# Patient Record
Sex: MALE | Race: OTHER | Employment: FULL TIME | URBAN - NONMETROPOLITAN AREA
[De-identification: names, ages, dates, MRNs, and addresses within clinical notes are randomized per-mention and may not be internally consistent; named-entity substitution may affect disease eponyms.]

---
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
## 2019-02-15 ENCOUNTER — NURSE TRIAGE (OUTPATIENT)
Dept: ADMINISTRATIVE | Age: 19
End: 2019-02-15

## 2019-02-15 ENCOUNTER — HOSPITAL ENCOUNTER (EMERGENCY)
Age: 19
Discharge: HOME | End: 2019-02-15
Attending: EMERGENCY MEDICINE

## 2019-02-15 VITALS
RESPIRATION RATE: 16 BRPM | BODY MASS INDEX: 32.95 KG/M2 | OXYGEN SATURATION: 99 % | WEIGHT: 205 LBS | DIASTOLIC BLOOD PRESSURE: 92 MMHG | TEMPERATURE: 98 F | HEIGHT: 66 IN | SYSTOLIC BLOOD PRESSURE: 132 MMHG | HEART RATE: 110 BPM

## 2019-02-15 DIAGNOSIS — M77.8 TENDINITIS OF RIGHT FOREARM: ICD-10-CM

## 2019-02-15 DIAGNOSIS — M79.631 PAIN IN RIGHT FOREARM: Primary | ICD-10-CM

## 2019-02-15 PROCEDURE — 99283 EMERGENCY DEPT VISIT LOW MDM: CPT

## 2019-02-15 PROCEDURE — 6370000000 HC RX 637 (ALT 250 FOR IP): Performed by: EMERGENCY MEDICINE

## 2019-02-15 RX ORDER — CEPHALEXIN 500 MG/1
500 CAPSULE ORAL 3 TIMES DAILY
Qty: 21 CAPSULE | Refills: 0 | Status: SHIPPED | OUTPATIENT
Start: 2019-02-15 | End: 2019-02-22

## 2019-02-15 RX ORDER — CEPHALEXIN 500 MG/1
500 CAPSULE ORAL ONCE
Status: COMPLETED | OUTPATIENT
Start: 2019-02-15 | End: 2019-02-15

## 2019-02-15 RX ADMIN — CEPHALEXIN 500 MG: 500 CAPSULE ORAL at 22:06

## 2019-02-15 ASSESSMENT — PAIN DESCRIPTION - LOCATION: LOCATION: ARM

## 2019-02-15 ASSESSMENT — PAIN DESCRIPTION - ORIENTATION: ORIENTATION: RIGHT

## 2019-02-15 ASSESSMENT — PAIN SCALES - GENERAL: PAINLEVEL_OUTOF10: 6

## 2019-03-14 ENCOUNTER — HOSPITAL ENCOUNTER (EMERGENCY)
Age: 19
Discharge: HOME | End: 2019-03-14
Attending: FAMILY MEDICINE

## 2019-03-14 VITALS
SYSTOLIC BLOOD PRESSURE: 127 MMHG | RESPIRATION RATE: 20 BRPM | OXYGEN SATURATION: 97 % | DIASTOLIC BLOOD PRESSURE: 77 MMHG | HEART RATE: 100 BPM | TEMPERATURE: 98.8 F

## 2019-03-14 DIAGNOSIS — R11.2 NON-INTRACTABLE VOMITING WITH NAUSEA, UNSPECIFIED VOMITING TYPE: ICD-10-CM

## 2019-03-14 DIAGNOSIS — G44.039 NONINTRACTABLE PAROXYSMAL HEMICRANIA, UNSPECIFIED CHRONICITY PATTERN: Primary | ICD-10-CM

## 2019-03-14 LAB
ALBUMIN SERPL-MCNC: 3.7 GM/DL (ref 3.4–5)
ALP BLD-CCNC: 90 U/L (ref 46–116)
ALT SERPL-CCNC: 25 U/L (ref 14–63)
ANION GAP: 8 MEQ/L (ref 8–16)
AST SERPL-CCNC: 15 U/L (ref 15–37)
BASOPHILS # BLD: 0.7 % (ref 0–3)
BILIRUB SERPL-MCNC: 0.4 MG/DL (ref 0.2–1)
BUN BLDV-MCNC: 9 MG/DL (ref 7–18)
CHLORIDE BLD-SCNC: 102 MEQ/L (ref 98–107)
CO2: 28 MEQ/L (ref 21–32)
CREAT SERPL-MCNC: 1.1 MG/DL (ref 0.6–1.3)
EOSINOPHILS RELATIVE PERCENT: 0.7 % (ref 0–4)
FLU A ANTIGEN: NEGATIVE
FLU B ANTIGEN: NEGATIVE
GFR, ESTIMATED: > 90 ML/MIN/1.73M2
GLUCOSE BLD-MCNC: 106 MG/DL (ref 74–106)
GROUP A STREP CULTURE, REFLEX: NEGATIVE
HCT VFR BLD CALC: 42.2 % (ref 42–52)
HEMOGLOBIN: 14.3 GM/DL (ref 14–18)
LYMPHOCYTES # BLD: 15.6 % (ref 15–47)
MCH RBC QN AUTO: 29.7 PG (ref 27–31)
MCHC RBC AUTO-ENTMCNC: 34 GM/DL (ref 33–37)
MCV RBC AUTO: 87.4 FL (ref 80–94)
MONOCYTES: 14.1 % (ref 0–12)
PDW BLD-RTO: 13.2 % (ref 11.5–14.5)
PLATELET # BLD: 263 THOU/MM3 (ref 130–400)
PMV BLD AUTO: 6.8 FL (ref 7.4–10.4)
POC CALCIUM: 8.7 MG/DL (ref 8.5–10.1)
POTASSIUM SERPL-SCNC: 3.5 MEQ/L (ref 3.5–5.1)
RBC # BLD: 4.83 MILL/MM3 (ref 4.7–6.1)
REFLEX THROAT C + S: NORMAL
SEGS: 68.9 % (ref 43–75)
SODIUM BLD-SCNC: 138 MEQ/L (ref 136–145)
TOTAL PROTEIN: 7.4 GM/DL (ref 6.4–8.2)
WBC # BLD: 8.8 THOU/MM3 (ref 4.8–10.8)

## 2019-03-14 PROCEDURE — 85025 COMPLETE CBC W/AUTO DIFF WBC: CPT

## 2019-03-14 PROCEDURE — 2709999900 HC NON-CHARGEABLE SUPPLY

## 2019-03-14 PROCEDURE — 87804 INFLUENZA ASSAY W/OPTIC: CPT

## 2019-03-14 PROCEDURE — 80053 COMPREHEN METABOLIC PANEL: CPT

## 2019-03-14 PROCEDURE — 36415 COLL VENOUS BLD VENIPUNCTURE: CPT

## 2019-03-14 PROCEDURE — 2580000003 HC RX 258: Performed by: FAMILY MEDICINE

## 2019-03-14 PROCEDURE — 6360000002 HC RX W HCPCS: Performed by: FAMILY MEDICINE

## 2019-03-14 PROCEDURE — 96374 THER/PROPH/DIAG INJ IV PUSH: CPT

## 2019-03-14 PROCEDURE — 87880 STREP A ASSAY W/OPTIC: CPT

## 2019-03-14 PROCEDURE — 87070 CULTURE OTHR SPECIMN AEROBIC: CPT

## 2019-03-14 PROCEDURE — 99283 EMERGENCY DEPT VISIT LOW MDM: CPT

## 2019-03-14 RX ORDER — ONDANSETRON 4 MG/1
4 TABLET, ORALLY DISINTEGRATING ORAL EVERY 8 HOURS PRN
Qty: 20 TABLET | Refills: 0 | Status: SHIPPED | OUTPATIENT
Start: 2019-03-14 | End: 2019-03-19 | Stop reason: ALTCHOICE

## 2019-03-14 RX ORDER — 0.9 % SODIUM CHLORIDE 0.9 %
1000 INTRAVENOUS SOLUTION INTRAVENOUS ONCE
Status: COMPLETED | OUTPATIENT
Start: 2019-03-14 | End: 2019-03-14

## 2019-03-14 RX ORDER — KETOROLAC TROMETHAMINE 30 MG/ML
30 INJECTION, SOLUTION INTRAMUSCULAR; INTRAVENOUS ONCE
Status: COMPLETED | OUTPATIENT
Start: 2019-03-14 | End: 2019-03-14

## 2019-03-14 RX ADMIN — KETOROLAC TROMETHAMINE 30 MG: 30 INJECTION, SOLUTION INTRAMUSCULAR at 12:18

## 2019-03-14 RX ADMIN — SODIUM CHLORIDE 1000 ML: 9 INJECTION, SOLUTION INTRAVENOUS at 12:18

## 2019-03-14 ASSESSMENT — PAIN DESCRIPTION - LOCATION
LOCATION: HEAD
LOCATION: HEAD

## 2019-03-14 ASSESSMENT — ENCOUNTER SYMPTOMS
SINUS PRESSURE: 0
VOMITING: 1
EYE PAIN: 0
ABDOMINAL DISTENTION: 0
ABDOMINAL PAIN: 0
SORE THROAT: 1
CONSTIPATION: 0
EYE DISCHARGE: 0
COUGH: 1
WHEEZING: 0
CHEST TIGHTNESS: 0
NAUSEA: 1
PHOTOPHOBIA: 0
STRIDOR: 0
EYE REDNESS: 0
SHORTNESS OF BREATH: 0
BACK PAIN: 0
RHINORRHEA: 0
DIARRHEA: 0

## 2019-03-14 ASSESSMENT — PAIN SCALES - GENERAL
PAINLEVEL_OUTOF10: 4
PAINLEVEL_OUTOF10: 9
PAINLEVEL_OUTOF10: 8

## 2019-03-16 LAB — THROAT/NOSE CULTURE: NORMAL

## 2019-03-19 ENCOUNTER — HOSPITAL ENCOUNTER (EMERGENCY)
Age: 19
Discharge: HOME | End: 2019-03-19
Attending: EMERGENCY MEDICINE

## 2019-03-19 VITALS
DIASTOLIC BLOOD PRESSURE: 82 MMHG | TEMPERATURE: 98.8 F | HEART RATE: 66 BPM | RESPIRATION RATE: 16 BRPM | SYSTOLIC BLOOD PRESSURE: 122 MMHG | BODY MASS INDEX: 31.99 KG/M2 | WEIGHT: 192 LBS | OXYGEN SATURATION: 100 % | HEIGHT: 65 IN

## 2019-03-19 DIAGNOSIS — K05.10 GINGIVITIS: Primary | ICD-10-CM

## 2019-03-19 PROCEDURE — 99282 EMERGENCY DEPT VISIT SF MDM: CPT

## 2019-03-19 PROCEDURE — 6370000000 HC RX 637 (ALT 250 FOR IP): Performed by: EMERGENCY MEDICINE

## 2019-03-19 RX ORDER — CLINDAMYCIN HYDROCHLORIDE 150 MG/1
300 CAPSULE ORAL 3 TIMES DAILY
Qty: 42 CAPSULE | Refills: 0 | Status: SHIPPED | OUTPATIENT
Start: 2019-03-19 | End: 2019-03-26

## 2019-03-19 RX ORDER — CLINDAMYCIN HYDROCHLORIDE 150 MG/1
300 CAPSULE ORAL ONCE
Status: COMPLETED | OUTPATIENT
Start: 2019-03-19 | End: 2019-03-19

## 2019-03-19 RX ORDER — CHLORHEXIDINE GLUCONATE 0.12 MG/ML
15 RINSE ORAL 2 TIMES DAILY
Qty: 420 ML | Refills: 0 | Status: SHIPPED | OUTPATIENT
Start: 2019-03-19 | End: 2019-04-02

## 2019-03-19 RX ADMIN — CLINDAMYCIN HYDROCHLORIDE 300 MG: 150 CAPSULE ORAL at 17:34

## 2019-03-19 ASSESSMENT — PAIN DESCRIPTION - PAIN TYPE: TYPE: ACUTE PAIN

## 2019-03-19 ASSESSMENT — PAIN DESCRIPTION - LOCATION: LOCATION: MOUTH

## 2019-03-19 ASSESSMENT — PAIN SCALES - GENERAL: PAINLEVEL_OUTOF10: 10

## 2019-06-06 ENCOUNTER — HOSPITAL ENCOUNTER (EMERGENCY)
Age: 19
Discharge: HOME | End: 2019-06-06
Attending: FAMILY MEDICINE

## 2019-06-06 VITALS
DIASTOLIC BLOOD PRESSURE: 63 MMHG | TEMPERATURE: 96.7 F | SYSTOLIC BLOOD PRESSURE: 118 MMHG | OXYGEN SATURATION: 98 % | RESPIRATION RATE: 16 BRPM | HEART RATE: 89 BPM

## 2019-06-06 DIAGNOSIS — G44.209 ACUTE NON INTRACTABLE TENSION-TYPE HEADACHE: Primary | ICD-10-CM

## 2019-06-06 PROCEDURE — 6360000002 HC RX W HCPCS: Performed by: FAMILY MEDICINE

## 2019-06-06 PROCEDURE — 6370000000 HC RX 637 (ALT 250 FOR IP): Performed by: FAMILY MEDICINE

## 2019-06-06 PROCEDURE — 99283 EMERGENCY DEPT VISIT LOW MDM: CPT

## 2019-06-06 PROCEDURE — 96372 THER/PROPH/DIAG INJ SC/IM: CPT

## 2019-06-06 RX ORDER — ONDANSETRON 4 MG/1
4 TABLET, ORALLY DISINTEGRATING ORAL ONCE
Status: COMPLETED | OUTPATIENT
Start: 2019-06-06 | End: 2019-06-06

## 2019-06-06 RX ORDER — KETOROLAC TROMETHAMINE 30 MG/ML
30 INJECTION, SOLUTION INTRAMUSCULAR; INTRAVENOUS ONCE
Status: COMPLETED | OUTPATIENT
Start: 2019-06-06 | End: 2019-06-06

## 2019-06-06 RX ADMIN — KETOROLAC TROMETHAMINE 30 MG: 30 INJECTION, SOLUTION INTRAMUSCULAR at 07:01

## 2019-06-06 RX ADMIN — ONDANSETRON 4 MG: 4 TABLET, ORALLY DISINTEGRATING ORAL at 07:02

## 2019-06-06 ASSESSMENT — PAIN DESCRIPTION - PAIN TYPE: TYPE: ACUTE PAIN

## 2019-06-06 ASSESSMENT — PAIN SCALES - GENERAL
PAINLEVEL_OUTOF10: 8
PAINLEVEL_OUTOF10: 8
PAINLEVEL_OUTOF10: 3
PAINLEVEL_OUTOF10: 3

## 2019-06-06 ASSESSMENT — ENCOUNTER SYMPTOMS
COUGH: 0
ABDOMINAL PAIN: 0
SHORTNESS OF BREATH: 0
NAUSEA: 1
DIARRHEA: 0
VOMITING: 1
CONSTIPATION: 0

## 2019-06-06 ASSESSMENT — PAIN DESCRIPTION - ORIENTATION: ORIENTATION: POSTERIOR

## 2019-06-06 ASSESSMENT — PAIN DESCRIPTION - LOCATION: LOCATION: HEAD

## 2019-06-06 NOTE — ED PROVIDER NOTES
[address] [facility]  eMERGENCY dEPARTMENT eNCOUnter          CHIEF COMPLAINT       Chief Complaint   Patient presents with    Headache       Nurses Notes reviewed and I agree except as noted in the HPI. HISTORY OF PRESENT ILLNESS    Arden Child III is a 22 y.o. male who presents for evaluation of posterior headache. It is rated an 8/10 in severity. Patient took Ibuprofen many hours ago. He noted some associated nausea this morning and one episode of emesis. He denies any URI symptoms cough, chest pain, or abdominal pain. REVIEW OF SYSTEMS     Review of Systems   Constitutional: Negative for chills and fever. Respiratory: Negative for cough and shortness of breath. Cardiovascular: Negative for chest pain and palpitations. Gastrointestinal: Positive for nausea and vomiting. Negative for abdominal pain, constipation and diarrhea. Neurological: Positive for headaches. Negative for light-headedness. PAST MEDICAL HISTORY    has a past medical history of ADHD (attention deficit hyperactivity disorder), Asthma, and Headache. SURGICAL HISTORY      has a past surgical history that includes Knee arthroscopy (Right) and Colonoscopy (2/26/16). CURRENT MEDICATIONS       Previous Medications    No medications on file       ALLERGIES     is allergic to bactrim. FAMILY HISTORY     indicated that his mother is alive. He indicated that his father is alive. He indicated that the status of his maternal grandmother is unknown. He indicated that the status of his maternal grandfather is unknown. He indicated that the status of his paternal grandmother is unknown. He indicated that the status of his neg hx is unknown.   family history includes Diabetes in his maternal grandfather; Heart Disease in his maternal grandfather; High Blood Pressure in his maternal grandmother and paternal grandmother.     SOCIAL HISTORY      reports that he is a non-smoker but has been exposed to tobacco smoke. He has never used smokeless tobacco. He reports that he has current or past drug history. Drug: Marijuana. He reports that he does not drink alcohol. PHYSICAL EXAM     INITIAL VITALS:  oral temperature is 96.7 °F (35.9 °C). His blood pressure is 118/63 and his pulse is 89. His respiration is 16 and oxygen saturation is 98%. Physical Exam   Constitutional: He is oriented to person, place, and time. He appears well-developed and well-nourished. HENT:   Head: Normocephalic and atraumatic. Mouth/Throat: Oropharynx is clear and moist.   Eyes: Pupils are equal, round, and reactive to light. Conjunctivae and EOM are normal. Right eye exhibits no discharge. Left eye exhibits no discharge. Cardiovascular: Normal rate, regular rhythm and normal heart sounds. Pulmonary/Chest: Effort normal and breath sounds normal.   Abdominal: Soft. Bowel sounds are normal. He exhibits no mass. There is no tenderness. Neurological: He is alert and oriented to person, place, and time. No cranial nerve deficit. Coordination normal.   Skin: Skin is warm and dry. Nursing note and vitals reviewed. DIFFERENTIAL DIAGNOSIS:   Tension headache, migraine     DIAGNOSTIC RESULTS       LABS:   Labs Reviewed - No data to display    EMERGENCY DEPARTMENT COURSE:   Vitals:    Vitals:    06/06/19 0646 06/06/19 0734   BP: (!) 142/86 118/63   Pulse: 84 89   Resp: 18 16   Temp: 96.7 °F (35.9 °C)    TempSrc: Oral    SpO2: 99% 98%     MDM  Patient presents for evaluation of headache. Chemistry is within normal limits. Patient administered Toradol and Phenergan with some relief. Recommended follow-up with his PCP and use of otc analgesics as directed as needed. CRITICAL CARE:   None    CONSULTS:  None    PROCEDURES:  None    FINAL IMPRESSION      1. Acute non intractable tension-type headache          DISPOSITION/PLAN   Discharge    PATIENT REFERRED TO:  Muna Vila, APRN - CNP  [address]  [facility] [id number]  [phone]    Schedule an appointment as soon as possible for a visit   As needed      DISCHARGEMEDICATIONS:  New Prescriptions    No medications on file       (Please note that portions of this note were completedwith a voice recognition program.  Efforts were made to edit the dictations but occasionally words are mis-transcribed.)    MD Chava Lane MD  06/06/19 7880

## 2019-06-25 ENCOUNTER — HOSPITAL ENCOUNTER (EMERGENCY)
Age: 19
Discharge: HOME | End: 2019-06-25
Attending: FAMILY MEDICINE

## 2019-06-25 VITALS
SYSTOLIC BLOOD PRESSURE: 124 MMHG | OXYGEN SATURATION: 99 % | HEIGHT: 66 IN | HEART RATE: 90 BPM | WEIGHT: 200 LBS | DIASTOLIC BLOOD PRESSURE: 59 MMHG | RESPIRATION RATE: 16 BRPM | TEMPERATURE: 98 F | BODY MASS INDEX: 32.14 KG/M2

## 2019-06-25 DIAGNOSIS — G43.009 MIGRAINE WITHOUT AURA AND WITHOUT STATUS MIGRAINOSUS, NOT INTRACTABLE: Primary | ICD-10-CM

## 2019-06-25 PROCEDURE — 2580000003 HC RX 258: Performed by: FAMILY MEDICINE

## 2019-06-25 PROCEDURE — 2709999900 HC NON-CHARGEABLE SUPPLY

## 2019-06-25 PROCEDURE — 99283 EMERGENCY DEPT VISIT LOW MDM: CPT

## 2019-06-25 PROCEDURE — 6370000000 HC RX 637 (ALT 250 FOR IP): Performed by: FAMILY MEDICINE

## 2019-06-25 PROCEDURE — 96374 THER/PROPH/DIAG INJ IV PUSH: CPT

## 2019-06-25 PROCEDURE — 6360000002 HC RX W HCPCS: Performed by: FAMILY MEDICINE

## 2019-06-25 PROCEDURE — 96375 TX/PRO/DX INJ NEW DRUG ADDON: CPT

## 2019-06-25 PROCEDURE — 96372 THER/PROPH/DIAG INJ SC/IM: CPT

## 2019-06-25 RX ORDER — METOCLOPRAMIDE HYDROCHLORIDE 5 MG/ML
10 INJECTION INTRAMUSCULAR; INTRAVENOUS ONCE
Status: COMPLETED | OUTPATIENT
Start: 2019-06-25 | End: 2019-06-25

## 2019-06-25 RX ORDER — DIPHENHYDRAMINE HYDROCHLORIDE 50 MG/ML
25 INJECTION INTRAMUSCULAR; INTRAVENOUS ONCE
Status: COMPLETED | OUTPATIENT
Start: 2019-06-25 | End: 2019-06-25

## 2019-06-25 RX ORDER — 0.9 % SODIUM CHLORIDE 0.9 %
1000 INTRAVENOUS SOLUTION INTRAVENOUS ONCE
Status: COMPLETED | OUTPATIENT
Start: 2019-06-25 | End: 2019-06-25

## 2019-06-25 RX ORDER — KETOROLAC TROMETHAMINE 30 MG/ML
30 INJECTION, SOLUTION INTRAMUSCULAR; INTRAVENOUS ONCE
Status: COMPLETED | OUTPATIENT
Start: 2019-06-25 | End: 2019-06-25

## 2019-06-25 RX ORDER — ONDANSETRON 4 MG/1
4 TABLET, ORALLY DISINTEGRATING ORAL ONCE
Status: COMPLETED | OUTPATIENT
Start: 2019-06-25 | End: 2019-06-25

## 2019-06-25 RX ORDER — PROMETHAZINE HYDROCHLORIDE 25 MG/ML
12.5 INJECTION, SOLUTION INTRAMUSCULAR; INTRAVENOUS ONCE
Status: COMPLETED | OUTPATIENT
Start: 2019-06-25 | End: 2019-06-25

## 2019-06-25 RX ORDER — IBUPROFEN 200 MG
200 TABLET ORAL EVERY 6 HOURS PRN
COMMUNITY

## 2019-06-25 RX ADMIN — PROMETHAZINE HYDROCHLORIDE 12.5 MG: 25 INJECTION INTRAMUSCULAR; INTRAVENOUS at 22:24

## 2019-06-25 RX ADMIN — DIPHENHYDRAMINE HYDROCHLORIDE 25 MG: 50 INJECTION, SOLUTION INTRAMUSCULAR; INTRAVENOUS at 22:46

## 2019-06-25 RX ADMIN — ONDANSETRON 4 MG: 4 TABLET, ORALLY DISINTEGRATING ORAL at 22:02

## 2019-06-25 RX ADMIN — METOCLOPRAMIDE 10 MG: 5 INJECTION, SOLUTION INTRAMUSCULAR; INTRAVENOUS at 22:46

## 2019-06-25 RX ADMIN — ONDANSETRON 4 MG: 4 TABLET, ORALLY DISINTEGRATING ORAL at 21:42

## 2019-06-25 RX ADMIN — SODIUM CHLORIDE 1000 ML: 9 INJECTION, SOLUTION INTRAVENOUS at 22:46

## 2019-06-25 RX ADMIN — KETOROLAC TROMETHAMINE 30 MG: 30 INJECTION, SOLUTION INTRAMUSCULAR at 21:43

## 2019-06-25 ASSESSMENT — PAIN DESCRIPTION - DESCRIPTORS
DESCRIPTORS: THROBBING;HEADACHE
DESCRIPTORS: HEADACHE
DESCRIPTORS: HEADACHE

## 2019-06-25 ASSESSMENT — ENCOUNTER SYMPTOMS
SHORTNESS OF BREATH: 0
NAUSEA: 1
DIARRHEA: 0
ABDOMINAL PAIN: 1
VOMITING: 1
PHOTOPHOBIA: 1
COUGH: 0
EYE PAIN: 0

## 2019-06-25 ASSESSMENT — PAIN SCALES - GENERAL
PAINLEVEL_OUTOF10: 8
PAINLEVEL_OUTOF10: 5
PAINLEVEL_OUTOF10: 8

## 2019-06-25 ASSESSMENT — PAIN DESCRIPTION - LOCATION
LOCATION: HEAD
LOCATION: HEAD
LOCATION: HAND

## 2019-06-25 ASSESSMENT — PAIN DESCRIPTION - ORIENTATION: ORIENTATION: POSTERIOR

## 2019-06-26 NOTE — ED PROVIDER NOTES
[address] [facility]  eMERGENCY dEPARTMENT eNCOUnter          [address] [facility]       Chief Complaint   Patient presents with    Migraine    Emesis       Nurses Notes reviewed and I agree except as noted in the HPI. HISTORY OF PRESENT ILLNESS    Arden Figueroa III is a 22 y.o. male who presents valuation of headache. Headache is located in the posterior head and described as throbbing. He rates his pain an 8/10 in severity. Patient states that he has taken Tylenol and Motrin drain without relief. He states that he has had some nausea and vomiting today. He states that he has vomited 3-4 times today. He has intimately noted some mid abdominal pain. REVIEW OF SYSTEMS     Review of Systems   Constitutional: Negative for chills and fever. Eyes: Positive for photophobia. Negative for pain. Respiratory: Negative for cough and shortness of breath. Cardiovascular: Negative for chest pain and palpitations. Gastrointestinal: Positive for abdominal pain, nausea and vomiting. Negative for diarrhea. Neurological: Positive for headaches. Negative for dizziness. PAST MEDICAL HISTORY    has a past medical history of ADHD (attention deficit hyperactivity disorder), Asthma, and Headache. SURGICAL HISTORY      has a past surgical history that includes Knee arthroscopy (Right) and Colonoscopy (2/26/16). CURRENT MEDICATIONS       Previous Medications    IBUPROFEN (ADVIL;MOTRIN) 200 MG TABLET    Take 200 mg by mouth every 6 hours as needed for Pain       ALLERGIES     is allergic to bactrim. FAMILY HISTORY     indicated that his mother is alive. He indicated that his father is alive. He indicated that the status of his maternal grandmother is unknown. He indicated that the status of his maternal grandfather is unknown. He indicated that the status of his paternal grandmother is unknown.  He indicated that the status of his neg hx is unknown.   family history includes Diabetes in his maternal grandfather; Heart Disease in his maternal grandfather; High Blood Pressure in his maternal grandmother and paternal grandmother. SOCIAL HISTORY      reports that he is a non-smoker but has been exposed to tobacco smoke. He has never used smokeless tobacco. He reports that he has current or past drug history. Drug: Marijuana. He reports that he does not drink alcohol. PHYSICAL EXAM     INITIAL VITALS:  height is 5' 6\" (1.676 m) and weight is 200 lb (90.7 kg). His oral temperature is 98 °F (36.7 °C). His blood pressure is 129/77 and his pulse is 104. His respiration is 16 and oxygen saturation is 96%. Physical Exam   Constitutional: He is oriented to person, place, and time. He appears well-developed and well-nourished. Cardiovascular: Normal rate, regular rhythm and normal heart sounds. Pulmonary/Chest: Effort normal and breath sounds normal.   Abdominal: Soft. Bowel sounds are normal. He exhibits no distension. There is no tenderness. Neurological: He is alert and oriented to person, place, and time. No cranial nerve deficit or sensory deficit. Skin: Skin is warm and dry. Nursing note and vitals reviewed. DIFFERENTIAL DIAGNOSIS:   Migraine, tension headache    DIAGNOSTIC RESULTS       LABS:   Labs Reviewed - No data to display    EMERGENCY DEPARTMENT COURSE:   Vitals:    Vitals:    06/25/19 2121   BP: 129/77   Pulse: 104   Resp: 16   Temp: 98 °F (36.7 °C)   TempSrc: Oral   SpO2: 96%   Weight: 200 lb (90.7 kg)   Height: 5' 6\" (1.676 m)     MDM  Patient presents for evaluation of migraine headache. Patient had vomiting within the department after receiving Zofran. Additional Zofran and Phenergan were then administered. Patient had good response to Toradol for his headache. Patient recommended to follow-up with his PCP regarding his migraine headaches. CRITICAL CARE:   None    CONSULTS:  None    PROCEDURES:  None    FINAL IMPRESSION      1.  Migraine without aura and without status migrainosus, not intractable          DISPOSITION/PLAN   Discharge    PATIENT REFERRED TO:  Blanka Vila, APRN - [facility]  [address] [facility] [id number] [phone]    Schedule an appointment as soon as possible for a visit in 3 days  migraine headache      DISCHARGEMEDICATIONS:  New Prescriptions    No medications on file       (Please note that portions of this note were completedwith a voice recognition program.  Efforts were made to edit the dictations but occasionally words are mis-transcribed.)    MD Rosanne Escobar MD  06/25/19 7550

## 2019-06-26 NOTE — ED NOTES
Patient presents today complaining of a migraine for past 3 days, gets them about monthly, usually go away with Tylenol or Ibuprofen but neither have worked this time. Today started vomiting and having more sensitivity to light. Pain in is posterior head and constant.      Josh Munoz RN  06/25/19 1509

## 2019-06-26 NOTE — ED NOTES
Patient called out, had another about 100mls brown emesis after the phenergan.      Douglas Perez RN  06/25/19 5614

## 2019-06-26 NOTE — ED NOTES
Patient called out, had another emesis on floor and counter. Dr. Javier notified and orders received.      Shelbie Felix RN  06/25/19 3237

## 2019-06-26 NOTE — ED NOTES
Patient called out, had another emesis and requesting water. Dr. Clementeen Babinski updated.       Sommer Abrams RN  06/25/19 1356

## 2020-11-18 ENCOUNTER — APPOINTMENT (OUTPATIENT)
Dept: GENERAL RADIOLOGY | Age: 20
End: 2020-11-18
Payer: MEDICARE

## 2020-11-18 ENCOUNTER — HOSPITAL ENCOUNTER (EMERGENCY)
Age: 20
Discharge: HOME | End: 2020-11-18
Attending: FAMILY MEDICINE
Payer: MEDICARE

## 2020-11-18 VITALS
OXYGEN SATURATION: 95 % | HEART RATE: 90 BPM | BODY MASS INDEX: 32.14 KG/M2 | TEMPERATURE: 98.4 F | SYSTOLIC BLOOD PRESSURE: 126 MMHG | WEIGHT: 200 LBS | DIASTOLIC BLOOD PRESSURE: 88 MMHG | RESPIRATION RATE: 18 BRPM | HEIGHT: 66 IN

## 2020-11-18 LAB
ALBUMIN SERPL-MCNC: 4.2 GM/DL (ref 3.4–5)
ALP BLD-CCNC: 119 U/L (ref 46–116)
ALT SERPL-CCNC: 29 U/L (ref 14–63)
ANION GAP: 9 MEQ/L (ref 8–16)
AST SERPL-CCNC: 19 U/L (ref 15–37)
BASOPHILS # BLD: 0.4 % (ref 0–3)
BILIRUB SERPL-MCNC: 0.6 MG/DL (ref 0.2–1)
BUN BLDV-MCNC: 9 MG/DL (ref 7–18)
CHLORIDE BLD-SCNC: 104 MEQ/L (ref 98–107)
CO2: 28 MEQ/L (ref 21–32)
CREAT SERPL-MCNC: 0.9 MG/DL (ref 0.6–1.3)
EOSINOPHILS RELATIVE PERCENT: 25 % (ref 0–4)
GFR, ESTIMATED: > 90 ML/MIN/1.73M2
GLUCOSE BLD-MCNC: 97 MG/DL (ref 74–106)
HCT VFR BLD CALC: 47.7 % (ref 42–52)
HEMOGLOBIN: 15.5 GM/DL (ref 14–18)
LYMPHOCYTES # BLD: 17.6 % (ref 15–47)
MCH RBC QN AUTO: 28.2 PG (ref 27–31)
MCHC RBC AUTO-ENTMCNC: 32.5 GM/DL (ref 33–37)
MCV RBC AUTO: 86.9 FL (ref 80–94)
MONOCYTES: 6.6 % (ref 0–12)
PDW BLD-RTO: 14.6 % (ref 11.5–14.5)
PLATELET # BLD: 299 THOU/MM3 (ref 130–400)
PLATELET ESTIMATE: ADEQUATE
PMV BLD AUTO: 7 FL (ref 7.4–10.4)
POC CALCIUM: 8.8 MG/DL (ref 8.5–10.1)
POTASSIUM SERPL-SCNC: 3.9 MEQ/L (ref 3.5–5.1)
RBC # BLD: 5.49 MILL/MM3 (ref 4.7–6.1)
SARS-COV-2, NAAT: NOT DETECTED
SCAN OF BLOOD SMEAR: NORMAL
SEGS: 50.4 % (ref 43–75)
SODIUM BLD-SCNC: 141 MEQ/L (ref 136–145)
TOTAL PROTEIN: 7.9 GM/DL (ref 6.4–8.2)
WBC # BLD: 9.8 THOU/MM3 (ref 4.8–10.8)

## 2020-11-18 PROCEDURE — 80053 COMPREHEN METABOLIC PANEL: CPT

## 2020-11-18 PROCEDURE — 85025 COMPLETE CBC W/AUTO DIFF WBC: CPT

## 2020-11-18 PROCEDURE — 36415 COLL VENOUS BLD VENIPUNCTURE: CPT

## 2020-11-18 PROCEDURE — 99284 EMERGENCY DEPT VISIT MOD MDM: CPT

## 2020-11-18 PROCEDURE — 71045 X-RAY EXAM CHEST 1 VIEW: CPT

## 2020-11-18 PROCEDURE — 94640 AIRWAY INHALATION TREATMENT: CPT

## 2020-11-18 PROCEDURE — U0002 COVID-19 LAB TEST NON-CDC: HCPCS

## 2020-11-18 PROCEDURE — 2709999900 HC NON-CHARGEABLE SUPPLY

## 2020-11-18 PROCEDURE — 6360000002 HC RX W HCPCS: Performed by: FAMILY MEDICINE

## 2020-11-18 PROCEDURE — 96372 THER/PROPH/DIAG INJ SC/IM: CPT

## 2020-11-18 PROCEDURE — 6370000000 HC RX 637 (ALT 250 FOR IP): Performed by: FAMILY MEDICINE

## 2020-11-18 RX ORDER — METHYLPREDNISOLONE SODIUM SUCCINATE 125 MG/2ML
80 INJECTION, POWDER, LYOPHILIZED, FOR SOLUTION INTRAMUSCULAR; INTRAVENOUS ONCE
Status: COMPLETED | OUTPATIENT
Start: 2020-11-18 | End: 2020-11-18

## 2020-11-18 RX ORDER — IPRATROPIUM BROMIDE AND ALBUTEROL SULFATE 2.5; .5 MG/3ML; MG/3ML
1 SOLUTION RESPIRATORY (INHALATION) ONCE
Status: COMPLETED | OUTPATIENT
Start: 2020-11-18 | End: 2020-11-18

## 2020-11-18 RX ORDER — PREDNISONE 10 MG/1
TABLET ORAL
Qty: 20 TABLET | Refills: 0 | Status: SHIPPED | OUTPATIENT
Start: 2020-11-18 | End: 2020-11-28

## 2020-11-18 RX ORDER — ALBUTEROL SULFATE 90 UG/1
2 AEROSOL, METERED RESPIRATORY (INHALATION) EVERY 4 HOURS PRN
Qty: 1 INHALER | Refills: 0 | Status: SHIPPED | OUTPATIENT
Start: 2020-11-18 | End: 2021-09-10 | Stop reason: RX

## 2020-11-18 RX ORDER — ALBUTEROL SULFATE 2.5 MG/3ML
2.5 SOLUTION RESPIRATORY (INHALATION) ONCE
Status: COMPLETED | OUTPATIENT
Start: 2020-11-18 | End: 2020-11-18

## 2020-11-18 RX ADMIN — METHYLPREDNISOLONE SODIUM SUCCINATE 80 MG: 125 INJECTION, POWDER, FOR SOLUTION INTRAMUSCULAR; INTRAVENOUS at 11:32

## 2020-11-18 RX ADMIN — ALBUTEROL SULFATE 2.5 MG: 2.5 SOLUTION RESPIRATORY (INHALATION) at 12:22

## 2020-11-18 RX ADMIN — IPRATROPIUM BROMIDE AND ALBUTEROL SULFATE 1 AMPULE: .5; 3 SOLUTION RESPIRATORY (INHALATION) at 11:33

## 2020-11-18 ASSESSMENT — PAIN DESCRIPTION - PAIN TYPE: TYPE: ACUTE PAIN

## 2020-11-18 ASSESSMENT — ENCOUNTER SYMPTOMS
WHEEZING: 1
VOMITING: 0
DIARRHEA: 1
SHORTNESS OF BREATH: 1
COUGH: 1
NAUSEA: 0

## 2020-11-18 ASSESSMENT — PAIN DESCRIPTION - LOCATION: LOCATION: BACK

## 2020-11-18 ASSESSMENT — PAIN SCALES - GENERAL: PAINLEVEL_OUTOF10: 8

## 2020-11-18 NOTE — ED PROVIDER NOTES
[address]/[facility] COMPLAINT       Chief Complaint   Patient presents with    Shortness of Breath       Nurses Notes reviewed and I agree except as noted in the HPI. HISTORY OF PRESENT ILLNESS    Arden Raymond III is a 23 y.o. male who presents for evaluation of cough, shortness of breath, wheezing, chills, and diarrhea. Patient states that his symptoms started a couple of days ago. He rates his level of discomfort an 8/10 severity. He has no known COVID-19 exposures. He reports a history of asthma but has not been able to use an inhaler as he has been out of the medication. REVIEW OF SYSTEMS     Review of Systems   Constitutional: Positive for chills. Negative for activity change, appetite change and fever. HENT: Negative for ear pain and sore throat. Respiratory: Positive for cough, shortness of breath and wheezing. Cardiovascular: Negative for chest pain and leg swelling. Gastrointestinal: Positive for diarrhea. Negative for abdominal pain, nausea and vomiting. Genitourinary: Negative for dysuria, flank pain and hematuria. Musculoskeletal: Negative for arthralgias, back pain, gait problem and neck pain. Skin: Negative for rash and wound. Neurological: Negative for weakness, light-headedness and headaches. Psychiatric/Behavioral: Negative for agitation and hallucinations. The patient is not nervous/anxious. PAST MEDICAL HISTORY    has a past medical history of ADHD (attention deficit hyperactivity disorder), Asthma, and Headache. SURGICAL HISTORY      has a past surgical history that includes Knee arthroscopy (Right) and Colonoscopy (2/26/16).     CURRENT MEDICATIONS       Discharge Medication List as of 11/18/2020  1:21 PM      CONTINUE these medications which have NOT CHANGED    Details   ibuprofen (ADVIL;MOTRIN) 200 MG tablet Take 200 mg by mouth every 6 hours as needed for PainHistorical Med ALLERGIES     is allergic to bactrim. FAMILY HISTORY     He indicated that his mother is alive. He indicated that his father is alive. He indicated that the status of his maternal grandmother is unknown. He indicated that the status of his maternal grandfather is unknown. He indicated that the status of his paternal grandmother is unknown. He indicated that the status of his neg hx is unknown.   family history includes Diabetes in his maternal grandfather; Heart Disease in his maternal grandfather; High Blood Pressure in his maternal grandmother and paternal grandmother. SOCIAL HISTORY      reports that he is a non-smoker but has been exposed to tobacco smoke. He has never used smokeless tobacco. He reports current drug use. Drug: Marijuana. He reports that he does not drink alcohol. PHYSICAL EXAM     INITIAL VITALS:  height is 5' 6\" (1.676 m) and weight is 200 lb (90.7 kg). His tympanic temperature is 98.4 °F (36.9 °C). His blood pressure is 126/88 and his pulse is 90. His respiration is 18 and oxygen saturation is 95%. Physical Exam  Vitals signs and nursing note reviewed. Constitutional:       General: He is not in acute distress. Appearance: He is not diaphoretic. Cardiovascular:      Rate and Rhythm: Normal rate and regular rhythm. Heart sounds: Normal heart sounds. Pulmonary:      Effort: Pulmonary effort is normal.      Breath sounds: Wheezing present. Abdominal:      General: Bowel sounds are normal. There is no distension. Palpations: Abdomen is soft. Tenderness: There is no abdominal tenderness. Lymphadenopathy:      Cervical: No cervical adenopathy. Skin:     General: Skin is warm and dry.          DIFFERENTIAL DIAGNOSIS:   Asthma exacerbation, viral URI, pneumonia, COVID-19  DIAGNOSTIC RESULTS         RADIOLOGY: non-plain filmimages(s) such as CT, Ultrasound and MRI are read by the radiologist.  XR CHEST PORTABLE   Final Result   No acute findings **This report has been created using voice recognition software. It may contain minor errors which are inherent in voice recognition technology. **      Final report electronically signed by Dr. Kurt Moore on 11/18/2020 12:24 PM            LABS:   Labs Reviewed   CBC WITH AUTO DIFFERENTIAL - Abnormal; Notable for the following components:       Result Value    MCHC 32.5 (*)     RDW 14.6 (*)     MPV 7.0 (*)     Eosinophils % 25.0 (*)     All other components within normal limits   COMPREHENSIVE METABOLIC PANEL - Abnormal; Notable for the following components:    Alkaline Phosphatase 119 (*)     All other components within normal limits   COVID-19   GLOMERULAR FILTRATION RATE, ESTIMATED   ANION GAP   SCAN OF BLOOD SMEAR       DEPARTMENT COURSE:   Vitals:    Vitals:    11/18/20 1040 11/18/20 1151 11/18/20 1245 11/18/20 1320   BP: (!) 137/90 119/80  126/88   Pulse: 92  90 90   Resp: 20 18 18   Temp: 98.4 °F (36.9 °C)      TempSrc: Tympanic      SpO2: 95% 92% 94% 95%   Weight: 200 lb (90.7 kg)      Height: 5' 6\" (1.676 m)          MDM:  Patient presents for evaluation of URI symptoms. He is wheezing in all lung fields bilaterally. He was given a DuoNeb treatment followed by an albuterol treatment. He had improvement in his breathing. He tested negative for COVID-19. We discussed isolating for short duration in case patient's test was a false negative. Follow-up for symptom worsening or for evaluation of new concerning symptoms. CRITICAL CARE:   None    CONSULTS:  None    PROCEDURES:  None    FINAL IMPRESSION      1. Viral URI with cough    2. Mild intermittent asthma with exacerbation          DISPOSITION/PLAN   Discharge    PATIENT REFERRED TO:  Mitra Vila, APRN - CNP  [address]  [facility] [id number]  [phone]    Schedule an appointment as soon as possible for a visit in 1 week  As needed      DISCHARGEMEDICATIONS:  Discharge Medication List as of 11/18/2020  1:21 PM      START taking these medications    Details   predniSONE (DELTASONE) 10 MG tablet Take 4 tablets by mouth once daily for 5 days, Disp-20 tablet,R-0Print      albuterol sulfate HFA (PROVENTIL HFA) 108 (90 Base) MCG/ACT inhaler Inhale 2 puffs into the lungs every 4 hours as needed for Wheezing or Shortness of Breath (Space out to every 6 hours as symptoms improve) Space out to every 6 hours as symptoms improve., Disp-1 Inhaler,R-0Print             (Please note that portions of this note were completedwith a voice recognition program.  Efforts were made to edit the dictations but occasionally words are mis-transcribed.)    MD Lashaun Hernandez MD  11/19/20 0002

## 2020-11-18 NOTE — ED NOTES
Pt resting, resp even and unlabored, skin pink, warm and dry. Pt states he feels better, denies SOB at this time. INT discontinued and bandaid applied. Pt given discharge instructions and verbalizes understanding, pt released.      Shani Delacruz RN  11/18/20 9833

## 2020-11-18 NOTE — ED NOTES
Pt complains of feeling SOB for the last couple days. Pt also complains of a frequent cough and wheezing. Pt complains of chills, denies nausea or vomiting. Pt alert, resp even and unlabored, skin pale, warm and dry.      Juan José Mcdermott RN  11/18/20 4369

## 2020-11-18 NOTE — ED NOTES
Pt resting, aerosol completed, lungs with wheezes with increased aereation, resp even and unlabored.      Spencer Gilbert RN  11/18/20 3701 0 = independent

## 2020-11-19 ASSESSMENT — ENCOUNTER SYMPTOMS
ABDOMINAL PAIN: 0
BACK PAIN: 0
SORE THROAT: 0

## 2021-07-09 ENCOUNTER — TELEPHONE (OUTPATIENT)
Dept: FAMILY MEDICINE CLINIC | Age: 21
End: 2021-07-09

## 2021-07-09 NOTE — TELEPHONE ENCOUNTER
Patients aruna Cainclair is calling to see if Dr. Madison Sigala would accept patient as a new patient. He was seen at the eye doctor today and they advised him that he has allergy bump under his eyelids and to see a PCP. He is taking an OTC med for this but is needing to see a PCP at some time for this. Patient is not having any pain at this time. Please advise.      *patient would like called back after 3 PM*

## 2021-08-15 ENCOUNTER — HOSPITAL ENCOUNTER (EMERGENCY)
Age: 21
Discharge: HOME | End: 2021-08-15
Attending: EMERGENCY MEDICINE
Payer: MEDICARE

## 2021-08-15 VITALS
BODY MASS INDEX: 33.74 KG/M2 | RESPIRATION RATE: 14 BRPM | OXYGEN SATURATION: 97 % | DIASTOLIC BLOOD PRESSURE: 91 MMHG | SYSTOLIC BLOOD PRESSURE: 145 MMHG | WEIGHT: 215 LBS | HEART RATE: 80 BPM | HEIGHT: 67 IN

## 2021-08-15 DIAGNOSIS — J06.9 VIRAL URI WITH COUGH: Primary | ICD-10-CM

## 2021-08-15 DIAGNOSIS — J45.20 MILD INTERMITTENT ASTHMA WITHOUT COMPLICATION: ICD-10-CM

## 2021-08-15 LAB — SARS-COV-2, NAAT: NOT  DETECTED

## 2021-08-15 PROCEDURE — 87635 SARS-COV-2 COVID-19 AMP PRB: CPT

## 2021-08-15 PROCEDURE — 6370000000 HC RX 637 (ALT 250 FOR IP): Performed by: EMERGENCY MEDICINE

## 2021-08-15 PROCEDURE — 99283 EMERGENCY DEPT VISIT LOW MDM: CPT

## 2021-08-15 RX ORDER — PREDNISONE 20 MG/1
40 TABLET ORAL 2 TIMES DAILY
Qty: 20 TABLET | Refills: 0 | Status: SHIPPED | OUTPATIENT
Start: 2021-08-15 | End: 2021-08-20

## 2021-08-15 RX ORDER — PREDNISONE 20 MG/1
40 TABLET ORAL ONCE
Status: COMPLETED | OUTPATIENT
Start: 2021-08-15 | End: 2021-08-15

## 2021-08-15 RX ADMIN — PREDNISONE 40 MG: 20 TABLET ORAL at 11:25

## 2021-08-15 ASSESSMENT — ENCOUNTER SYMPTOMS
ABDOMINAL PAIN: 0
BLOOD IN STOOL: 0
COUGH: 1
WHEEZING: 1
EYE PAIN: 0
SORE THROAT: 0
SHORTNESS OF BREATH: 0
DIARRHEA: 1
EYE DISCHARGE: 0
VOMITING: 1

## 2021-08-15 ASSESSMENT — PAIN DESCRIPTION - LOCATION
LOCATION_2: THROAT
LOCATION_2: HEAD
LOCATION: HEAD
LOCATION: THROAT

## 2021-08-15 ASSESSMENT — PAIN DESCRIPTION - INTENSITY
RATING_2: 5
RATING_2: 5

## 2021-08-15 ASSESSMENT — PAIN SCALES - GENERAL
PAINLEVEL_OUTOF10: 5
PAINLEVEL_OUTOF10: 5

## 2021-08-15 NOTE — ED NOTES
Pt alert and oriented. Respirations regular and easy. Prescriptions sent to pharmacy and pt instructed on. Discharge instructions reviewed. States understanding. Pt discharged in satisfactory condition.        Charito Reilly RN  08/15/21 4445

## 2021-08-15 NOTE — LETTER
[address]  [address] [place] [address] [address]  Phone: [phone]               August 15, 2021    Patient: Hannah Lopez   YOB: 2000   Date of Visit: 8/15/2021       To Whom It May Concern:    Angy Sloan was seen and treated in our emergency department on 8/15/2021. He may return to work Tomorrow. .      Sincerely,       Yeny Willis MD         Signature:__________________________________

## 2021-08-15 NOTE — ED PROVIDER NOTES
[address]  [address] [facility] [address]  Phone: [phone]    eMERGENCY dEPARTMENT eNCOUnter           [address] [facility]       Chief Complaint   Patient presents with    Cough    Nasal Congestion    Emesis    Diarrhea       Nurses Notes reviewed and I agree except as noted in the HPI. HISTORY OF PRESENT ILLNESS    Arden Gutierrez III is a 24 y.o. male who presented via private vehicle with the chief complaint mentioned above. Symptoms started 3 days ago. He is complaining of nasal congestion and clear nasal discharge. He has asthma. He has mild shortness of breath. He used his albuterol prior to arrival and felt better. He has mild, intermittent nonproductive cough. He vomited once after coughing. He has 2 loose stools. He denies chest pain. He has no fever or chills. He is non-smoker. REVIEW OF SYSTEMS     Review of Systems   Constitutional: Negative for chills and fever. HENT: Positive for congestion. Negative for sore throat. Eyes: Negative for pain and discharge. Respiratory: Positive for cough and wheezing. Negative for shortness of breath. Cardiovascular: Negative for chest pain and palpitations. Gastrointestinal: Positive for diarrhea and vomiting. Negative for abdominal pain and blood in stool. Genitourinary: Negative for dysuria and hematuria. Musculoskeletal: Negative for neck pain and neck stiffness. Neurological: Negative for seizures, syncope and headaches. Hematological: Negative for adenopathy. Psychiatric/Behavioral: Negative for agitation and hallucinations. PAST MEDICAL HISTORY    has a past medical history of ADHD (attention deficit hyperactivity disorder), Asthma, and Headache. SURGICAL HISTORY      has a past surgical history that includes Knee arthroscopy (Right) and Colonoscopy (2/26/16).     CURRENT MEDICATIONS       Discharge Medication List as of 8/15/2021 11:18 AM      CONTINUE these medications which have NOT CHANGED    Details   albuterol sulfate HFA (PROVENTIL HFA) 108 (90 Base) MCG/ACT inhaler Inhale 2 puffs into the lungs every 4 hours as needed for Wheezing or Shortness of Breath (Space out to every 6 hours as symptoms improve) Space out to every 6 hours as symptoms improve., Disp-1 Inhaler,R-0Print      ibuprofen (ADVIL;MOTRIN) 200 MG tablet Take 200 mg by mouth every 6 hours as needed for PainHistorical Med             ALLERGIES     is allergic to bactrim. FAMILY HISTORY     He indicated that his mother is alive. He indicated that his father is alive. He indicated that the status of his maternal grandmother is unknown. He indicated that the status of his maternal grandfather is unknown. He indicated that the status of his paternal grandmother is unknown. He indicated that the status of his neg hx is unknown.   family history includes Diabetes in his maternal grandfather; Heart Disease in his maternal grandfather; High Blood Pressure in his maternal grandmother and paternal grandmother. SOCIAL HISTORY      reports that he is a non-smoker but has been exposed to tobacco smoke. He has never used smokeless tobacco. He reports previous drug use. He reports that he does not drink alcohol. PHYSICAL EXAM     INITIAL VITALS:  height is 5' 7\" (1.702 m) and weight is 215 lb (97.5 kg). His blood pressure is 145/91 (abnormal) and his pulse is 80. His respiration is 14 and oxygen saturation is 97%. Physical Exam  Vitals and nursing note reviewed. Constitutional:       General: He is not in acute distress. Appearance: He is well-developed. HENT:      Head: Atraumatic. Nose: Congestion present. Mouth/Throat:      Mouth: Mucous membranes are moist.   Eyes:      Conjunctiva/sclera: Conjunctivae normal.      Pupils: Pupils are equal, round, and reactive to light. Neck:      Thyroid: No thyromegaly. Vascular: No JVD. Trachea: No tracheal deviation.    Cardiovascular:      Rate and Rhythm: Normal rate and regular rhythm. Heart sounds: No murmur heard. No friction rub. No gallop. Pulmonary:      Effort: Pulmonary effort is normal. No respiratory distress. Comments: Breath sounds are stronger equal bilaterally. He has scattered and rare expiratory wheezings  Abdominal:      General: Bowel sounds are normal.      Palpations: Abdomen is soft. Tenderness: There is no abdominal tenderness. Musculoskeletal:         General: No tenderness. Cervical back: Neck supple. Neurological:      Mental Status: He is alert. DIFFERENTIAL DIAGNOSIS:     DIAGNOSTIC RESULTS       LABS:   Labs Reviewed   COVID-19   COVID-19   Covid was negative    EMERGENCY DEPARTMENT COURSE:   Vitals:    Vitals:    08/15/21 1010   BP: (!) 145/91   Pulse: 80   Resp: 14   SpO2: 97%   Weight: 215 lb (97.5 kg)   Height: 5' 7\" (1.702 m)     Patient does not appear ill or toxic. He is in no respiratory distress. He was given prednisone p.o. I discussed with him diagnosis and treatment plan. He demonstrated understanding. FINAL IMPRESSION      1. Viral URI with cough    2. Mild intermittent asthma without complication          DISPOSITION/PLAN   Discharged home in good condition. PATIENT REFERRED TO:  Christie Vila, APRN - [facility]  [address] [facility] [id number]  [phone]    In 2 days        DISCHARGE MEDICATIONS:  Discharge Medication List as of 8/15/2021 11:18 AM      START taking these medications    Details   predniSONE (DELTASONE) 20 MG tablet Take 2 tablets by mouth 2 times daily for 5 days, Disp-20 tablet, R-0Normal             (Please note that portions of this note were completed with a voice recognition program.  Efforts were made to edit the dictations but occasionally words are mis-transcribed.)    MD Caron Quiroga MD  08/15/21 5297

## 2021-08-15 NOTE — ED NOTES
Pt presents w/ c/o 3 day history of cough, congestion, headache, sore throat, diarrhea and 1 day history of vomiting. Respirations regular and easy. No distress noted. Respirations regular and easy. Lungs sound clear t/o. Congested cough noted. NP swab collected for rapid COVID screen.      Godwin Liz RN  08/15/21 2429

## 2021-09-10 ENCOUNTER — HOSPITAL ENCOUNTER (EMERGENCY)
Age: 21
Discharge: HOME | End: 2021-09-10
Attending: FAMILY MEDICINE
Payer: MEDICARE

## 2021-09-10 VITALS
HEART RATE: 106 BPM | SYSTOLIC BLOOD PRESSURE: 137 MMHG | HEIGHT: 65 IN | BODY MASS INDEX: 36.65 KG/M2 | RESPIRATION RATE: 17 BRPM | TEMPERATURE: 97.5 F | DIASTOLIC BLOOD PRESSURE: 93 MMHG | WEIGHT: 220 LBS | OXYGEN SATURATION: 96 %

## 2021-09-10 DIAGNOSIS — J45.21 EXACERBATION OF INTERMITTENT ASTHMA, UNSPECIFIED ASTHMA SEVERITY: ICD-10-CM

## 2021-09-10 DIAGNOSIS — J40 BRONCHITIS: Primary | ICD-10-CM

## 2021-09-10 LAB — SARS-COV-2, NAAT: NOT  DETECTED

## 2021-09-10 PROCEDURE — 6370000000 HC RX 637 (ALT 250 FOR IP): Performed by: FAMILY MEDICINE

## 2021-09-10 PROCEDURE — 6360000002 HC RX W HCPCS: Performed by: FAMILY MEDICINE

## 2021-09-10 PROCEDURE — 99284 EMERGENCY DEPT VISIT MOD MDM: CPT

## 2021-09-10 PROCEDURE — 87635 SARS-COV-2 COVID-19 AMP PRB: CPT

## 2021-09-10 PROCEDURE — 96372 THER/PROPH/DIAG INJ SC/IM: CPT

## 2021-09-10 RX ORDER — PREDNISONE 10 MG/1
TABLET ORAL
Qty: 20 TABLET | Refills: 0 | Status: SHIPPED | OUTPATIENT
Start: 2021-09-10 | End: 2021-09-20

## 2021-09-10 RX ORDER — METHYLPREDNISOLONE SODIUM SUCCINATE 125 MG/2ML
60 INJECTION, POWDER, LYOPHILIZED, FOR SOLUTION INTRAMUSCULAR; INTRAVENOUS ONCE
Status: COMPLETED | OUTPATIENT
Start: 2021-09-10 | End: 2021-09-10

## 2021-09-10 RX ORDER — ALBUTEROL SULFATE 90 UG/1
2 AEROSOL, METERED RESPIRATORY (INHALATION) EVERY 4 HOURS PRN
Qty: 18 G | Refills: 0 | Status: SHIPPED | OUTPATIENT
Start: 2021-09-10

## 2021-09-10 RX ORDER — IPRATROPIUM BROMIDE AND ALBUTEROL SULFATE 2.5; .5 MG/3ML; MG/3ML
1 SOLUTION RESPIRATORY (INHALATION) ONCE
Status: COMPLETED | OUTPATIENT
Start: 2021-09-10 | End: 2021-09-10

## 2021-09-10 RX ADMIN — IPRATROPIUM BROMIDE AND ALBUTEROL SULFATE 1 AMPULE: .5; 3 SOLUTION RESPIRATORY (INHALATION) at 15:03

## 2021-09-10 RX ADMIN — METHYLPREDNISOLONE SODIUM SUCCINATE 60 MG: 125 INJECTION, POWDER, FOR SOLUTION INTRAMUSCULAR; INTRAVENOUS at 15:03

## 2021-09-10 ASSESSMENT — PAIN DESCRIPTION - PAIN TYPE
TYPE: ACUTE PAIN
TYPE: ACUTE PAIN

## 2021-09-10 ASSESSMENT — ENCOUNTER SYMPTOMS
VOMITING: 0
EYE DISCHARGE: 0
SORE THROAT: 0
COUGH: 1
SHORTNESS OF BREATH: 1
EYE REDNESS: 0
NAUSEA: 0
WHEEZING: 1
RHINORRHEA: 1

## 2021-09-10 ASSESSMENT — PAIN SCALES - GENERAL
PAINLEVEL_OUTOF10: 6
PAINLEVEL_OUTOF10: 4

## 2021-09-10 ASSESSMENT — PAIN DESCRIPTION - LOCATION
LOCATION: HEAD
LOCATION: HEAD

## 2021-09-10 NOTE — ED PROVIDER NOTES
[facility]  eMERGENCY dEPARTMENT eNCOUnter          [address] [facility]       Chief Complaint   Patient presents with    Headache     feels a little SOB, cough, runny nose. Nurses Notes reviewed and I agree except as noted in the HPI. HISTORY OF PRESENT ILLNESS    Arden Dukes III is a 24 y.o. male who presents shortness of breath, wheezing, cough and runny nose. Duration of symptoms about 5 days. Denies any fever. Denies any alleviating measures. He does have a history of asthma. REVIEW OF SYSTEMS     Review of Systems   Constitutional: Negative for chills and fever. HENT: Positive for rhinorrhea. Negative for sore throat. Eyes: Negative for discharge and redness. Respiratory: Positive for cough, shortness of breath and wheezing. Gastrointestinal: Negative for nausea and vomiting. Musculoskeletal: Negative for arthralgias and myalgias. All other systems reviewed and are negative. PAST MEDICAL HISTORY    has a past medical history of ADHD (attention deficit hyperactivity disorder), Asthma, and Headache. SURGICAL HISTORY      has a past surgical history that includes Knee arthroscopy (Right) and Colonoscopy (2/26/16). CURRENT MEDICATIONS       Discharge Medication List as of 9/10/2021  3:44 PM      CONTINUE these medications which have NOT CHANGED    Details   ibuprofen (ADVIL;MOTRIN) 200 MG tablet Take 200 mg by mouth every 6 hours as needed for PainHistorical Med             ALLERGIES     is allergic to bactrim. FAMILY HISTORY     He indicated that his mother is alive. He indicated that his father is alive. He indicated that the status of his maternal grandmother is unknown. He indicated that the status of his maternal grandfather is unknown. He indicated that the status of his paternal grandmother is unknown.  He indicated that the status of his neg hx is unknown.   family history includes Diabetes in his maternal grandfather; Heart Disease in his maternal grandfather; High Blood Pressure in his maternal grandmother and paternal grandmother. SOCIAL HISTORY      reports that he is a non-smoker but has been exposed to tobacco smoke. He has never used smokeless tobacco. He reports previous drug use. He reports that he does not drink alcohol. PHYSICAL EXAM     INITIAL VITALS:  height is 5' 5\" (1.651 m) and weight is 220 lb (99.8 kg). His temporal temperature is 97.5 °F (36.4 °C). His blood pressure is 137/93 (abnormal) and his pulse is 106. His respiration is 17 and oxygen saturation is 96%. Physical Exam  Vitals and nursing note reviewed. Constitutional:       General: He is not in acute distress. Appearance: He is not ill-appearing. Eyes:      Extraocular Movements: Extraocular movements intact. Conjunctiva/sclera: Conjunctivae normal.      Pupils: Pupils are equal, round, and reactive to light. Cardiovascular:      Rate and Rhythm: Tachycardia present. Pulses: Normal pulses. Heart sounds: Normal heart sounds. Pulmonary:      Effort: No respiratory distress. Breath sounds: Wheezing present. Musculoskeletal:      Cervical back: Normal range of motion and neck supple. Lymphadenopathy:      Cervical: No cervical adenopathy. Neurological:      General: No focal deficit present. Mental Status: He is alert and oriented to person, place, and time.    Psychiatric:         Mood and Affect: Mood normal.         Behavior: Behavior normal.         DIFFERENTIAL DIAGNOSIS:   Asthma exacerbation,covid,bronchitis,    DIAGNOSTIC RESULTS     EKG: All EKG's are interpreted by the Emergency Department Physician who either signs or Co-signs this chart in the absence of a cardiologist.      RADIOLOGY: non-plain film images(s) such as CT, Ultrasound and MRI are read by the radiologist.      LABS:   Labs Reviewed   COVID-19, RAPID       EMERGENCY DEPARTMENT COURSE:   Vitals:    Vitals:    09/10/21 1440 09/10/21 1455 09/10/21 1510 09/10/21 1525   BP: (!) 131/95 132/88 (!) 137/100 (!) 137/93   Pulse: 95 110 110 106   Resp: 21 19 16 17   Temp:       TempSrc:       SpO2: 95% 95% 99% 96%   Weight:       Height:         On exam the patient does have diffuse wheezing bilaterally. Rest of the upper respiratory exam is unremarkable. Covid 19 rapid testing is negative at the bedside. A DuoNeb was provided, Solu-Medrol 60 mg IM given. At this time we will start the patient on prednisone, and albuterol metered-dose inhaler 2 puffs every 4 hours as needed and if symptoms are improving he can space out the albuterol to every 4 hours as needed. At this time I would recommend continued isolation at least for the weekend if symptoms are not improving then he can return. Care instructions were provided. PROCEDURES:  None    FINAL IMPRESSION      1. Bronchitis    2. Exacerbation of intermittent asthma, unspecified asthma severity          DISPOSITION/PLAN   Home. Care instructions provided. Follow-up with PCP or ED as needed. PATIENT REFERRED TO:  Javid Vila, APRN - [facility]  [address] [facility] [id number]  [phone]    Schedule an appointment as soon as possible for a visit in 3 days  If symptoms worsen, As needed      DISCHARGE MEDICATIONS:  Discharge Medication List as of 9/10/2021  3:44 PM      START taking these medications    Details   albuterol sulfate HFA (PROVENTIL HFA) 108 (90 Base) MCG/ACT inhaler Inhale 2 puffs into the lungs every 4 hours as needed for Wheezing or Shortness of Breath (Space out to every 6 hours as symptoms improve) Space out to every 6 hours as symptoms improve., Disp-18 g, R-0Normal      predniSONE (DELTASONE) 10 MG tablet Take 4 tablets by mouth once daily for 5 days, Disp-20 tablet, R-0Normal             (Please note that portions of this note were completed with a voice recognition program.  Efforts were made to edit the dictations but occasionally words are mis-transcribed.)    Carissa Zuniga MD Rachel Garcia MD  09/10/21 3000

## 2021-09-10 NOTE — ED TRIAGE NOTES
Pt with HA, cough, runny nose, feels SOB, diarrhea about twice a day, decreased appetite, and knees ache since Monday. Denies fever.

## 2021-09-10 NOTE — ED NOTES
Pt remains with I & E wheezes. Frequent cough, breathing with ease. Pulse ox=97%.       Lyndon Valdes RN  09/10/21 3150

## 2021-09-10 NOTE — ED NOTES
Pt pink, warm and dry, breathing with ease. Prescriptions explained, pt states understanding. AVS reviewed including follow up appointments, diagnosis, and care of self at home. Denies questions or concerns. Pt remains alert and oriented. Pt discharged in stable condition.       Boaz Weems RN  09/10/21 1789

## 2021-12-28 ENCOUNTER — NURSE ONLY (OUTPATIENT)
Dept: FAMILY MEDICINE CLINIC | Age: 21
End: 2021-12-28
Payer: MEDICARE

## 2021-12-28 DIAGNOSIS — R09.81 NOSE CONGESTION: ICD-10-CM

## 2021-12-28 DIAGNOSIS — R05.9 COUGH: Primary | ICD-10-CM

## 2021-12-28 LAB
INFLUENZA A ANTIBODY: NEGATIVE
INFLUENZA B ANTIBODY: NEGATIVE
Lab: ABNORMAL
QC PASS/FAIL: ABNORMAL
SARS-COV-2 RDRP RESP QL NAA+PROBE: POSITIVE

## 2021-12-28 PROCEDURE — 99211 OFF/OP EST MAY X REQ PHY/QHP: CPT | Performed by: FAMILY MEDICINE

## 2021-12-28 PROCEDURE — 87804 INFLUENZA ASSAY W/OPTIC: CPT | Performed by: FAMILY MEDICINE

## 2021-12-28 PROCEDURE — 87635 SARS-COV-2 COVID-19 AMP PRB: CPT | Performed by: FAMILY MEDICINE

## 2021-12-28 NOTE — PROGRESS NOTES
Pt came into the office with daughter who tested positive for Covid and influenza B.   Pt stated symptoms started 2 days ago with cough and congestion     Pt aware of test results

## 2022-07-18 ENCOUNTER — HOSPITAL ENCOUNTER (EMERGENCY)
Age: 22
Discharge: HOME | End: 2022-07-18
Attending: EMERGENCY MEDICINE
Payer: MEDICARE

## 2022-07-18 VITALS
WEIGHT: 215 LBS | RESPIRATION RATE: 18 BRPM | TEMPERATURE: 98 F | SYSTOLIC BLOOD PRESSURE: 129 MMHG | DIASTOLIC BLOOD PRESSURE: 84 MMHG | BODY MASS INDEX: 34.55 KG/M2 | HEART RATE: 108 BPM | OXYGEN SATURATION: 97 % | HEIGHT: 66 IN

## 2022-07-18 DIAGNOSIS — S61.411A LACERATION OF RIGHT HAND WITHOUT FOREIGN BODY, INITIAL ENCOUNTER: Primary | ICD-10-CM

## 2022-07-18 PROCEDURE — 12001 RPR S/N/AX/GEN/TRNK 2.5CM/<: CPT

## 2022-07-18 PROCEDURE — 99282 EMERGENCY DEPT VISIT SF MDM: CPT

## 2022-07-18 RX ORDER — LIDOCAINE HYDROCHLORIDE 10 MG/ML
5 INJECTION, SOLUTION INFILTRATION; PERINEURAL ONCE
Status: DISCONTINUED | OUTPATIENT
Start: 2022-07-18 | End: 2022-07-19 | Stop reason: HOSPADM

## 2022-07-18 ASSESSMENT — PAIN - FUNCTIONAL ASSESSMENT: PAIN_FUNCTIONAL_ASSESSMENT: NONE - DENIES PAIN

## 2022-07-19 NOTE — ED PROVIDER NOTES
the status of his maternal grandmother is unknown. He indicated that the status of his maternal grandfather is unknown. He indicated that the status of his paternal grandmother is unknown. He indicated that the status of his neg hx is unknown.   family history includes Diabetes in his maternal grandfather; Heart Disease in his maternal grandfather; High Blood Pressure in his maternal grandmother and paternal grandmother. SOCIAL HISTORY     reports that he is a non-smoker but has been exposed to tobacco smoke. He has never used smokeless tobacco. He reports that he does not currently use drugs. He reports that he does not drink alcohol. PHYSICAL EXAM       INITIAL VITALS: /84   Pulse (!) 108   Temp 98 °F (36.7 °C) (Temporal)   Resp 18   Ht 5' 6\" (1.676 m)   Wt 215 lb (97.5 kg)   SpO2 97%   BMI 34.70 kg/m²        Physical Exam  Vitals and nursing note reviewed. Constitutional:       General: He is not in acute distress. HENT:      Head: Atraumatic. Eyes:      Pupils: Pupils are equal, round, and reactive to light. Cardiovascular:      Rate and Rhythm: Normal rate. Musculoskeletal:      Comments: 2 cm laceration hypothenar eminence of the right hand, gaping, superficial, bleeding controlled, no foreign body. Skin:     General: Skin is warm and dry. Neurological:      General: No focal deficit present. Mental Status: He is alert and oriented to person, place, and time. Sensory: No sensory deficit. Psychiatric:         Behavior: Behavior normal.              Vitals:    Vitals:    07/18/22 2214 07/18/22 2325   BP: 129/84    Pulse: (!) 108    Resp: 16 18   Temp: 98 °F (36.7 °C)    TempSrc: Temporal    SpO2: 97%    Weight: 215 lb (97.5 kg)    Height: 5' 6\" (1.676 m)        EMERGENCY DEPARTMENT COURSE:    Repair as below. Dressing applied. Wound care discussed.         PROCEDURES:    Consent: Verbal, patient  Wound Location: Right hand hypothenar eminence  Wound Description: Linear, gaping  Wound Length: 2 cm  Antiseptic prep solution: Wound wash  Sterile drapes use, aseptic technique used throughout. Local anesthetic: Percent lidocaine 3 mL  Debridement: None  Sutures: 4-0 Ethilon, #4, superficial interrupted  Dressing: Dry sterile  Adacel: Not due  Antibiotic: Not indicated      FINAL IMPRESSION      1. Laceration of right hand without foreign body, initial encounter        DISPOSITION/PLAN    DISPOSITION Decision To Discharge 07/18/2022 11:07:06 PM      PATIENT REFERRED TO:    Lucia Vila, APRN - [facility]  [address] [facility] [id number]  [phone]    Schedule an appointment as soon as possible for a visit   suture removal 10-14 days.       DISCHARGE MEDICATIONS:    Discharge Medication List as of 7/18/2022 11:21 PM             (Please note that portions of this note were completed with a voice recognition program.  Efforts were made to edit the dictations but occasionally words are mis-transcribed.)       Aris Ordonez MD  07/19/22 1370

## 2022-07-19 NOTE — DISCHARGE INSTRUCTIONS
The wound clean and protected from injury and infection. Report any signs of infection. Over-the-counter pain medication as needed.

## 2022-09-25 ENCOUNTER — HOSPITAL ENCOUNTER (EMERGENCY)
Age: 22
Discharge: HOME | End: 2022-09-25
Attending: FAMILY MEDICINE
Payer: MEDICARE

## 2022-09-25 VITALS
DIASTOLIC BLOOD PRESSURE: 68 MMHG | BODY MASS INDEX: 33.75 KG/M2 | TEMPERATURE: 98.6 F | RESPIRATION RATE: 16 BRPM | HEART RATE: 75 BPM | OXYGEN SATURATION: 96 % | HEIGHT: 66 IN | WEIGHT: 210 LBS | SYSTOLIC BLOOD PRESSURE: 117 MMHG

## 2022-09-25 DIAGNOSIS — J40 BRONCHITIS: Primary | ICD-10-CM

## 2022-09-25 LAB — SARS-COV-2, NAAT: NOT  DETECTED

## 2022-09-25 PROCEDURE — 87635 SARS-COV-2 COVID-19 AMP PRB: CPT

## 2022-09-25 PROCEDURE — 99283 EMERGENCY DEPT VISIT LOW MDM: CPT

## 2022-09-25 RX ORDER — PREDNISONE 10 MG/1
TABLET ORAL
Qty: 20 TABLET | Refills: 0 | Status: SHIPPED | OUTPATIENT
Start: 2022-09-25 | End: 2022-10-05

## 2022-09-25 RX ORDER — AZITHROMYCIN 250 MG/1
TABLET, FILM COATED ORAL
Qty: 1 PACKET | Refills: 0 | Status: SHIPPED | OUTPATIENT
Start: 2022-09-25 | End: 2022-09-29

## 2022-09-25 ASSESSMENT — ENCOUNTER SYMPTOMS
SORE THROAT: 0
COLOR CHANGE: 0
VOMITING: 0
WHEEZING: 1
COUGH: 1
EYE DISCHARGE: 0
EYE REDNESS: 0
NAUSEA: 0

## 2022-09-25 NOTE — ED PROVIDER NOTES
[facility]  eMERGENCY dEPARTMENT eNCOUnter          [address] [facility]       Chief Complaint   Patient presents with    Cough    Fatigue    Headache    Nasal Congestion       Nurses Notes reviewed and I agree except as noted in the HPI. HISTORY OF PRESENT ILLNESS    Arden Braden III is a 20 y.o. male who presents cough, fatigue, headache, nasal congestion, wheezing. Symptoms started a few days ago according to the patient. He denies any fever. Does note that his daughter has some mild upper respiratory symptoms. Denies any alleviating measures except for albuterol inhaler that he has at home for some reactive airway disease. REVIEW OF SYSTEMS     Review of Systems   Constitutional:  Negative for chills and fever. HENT:  Positive for congestion. Negative for sore throat. Eyes:  Negative for discharge and redness. Respiratory:  Positive for cough and wheezing. Gastrointestinal:  Negative for nausea and vomiting. Musculoskeletal:  Positive for myalgias. Negative for arthralgias. Skin:  Negative for color change and rash. All other systems reviewed and are negative. PAST MEDICAL HISTORY    has a past medical history of ADHD (attention deficit hyperactivity disorder), Asthma, and Headache. SURGICAL HISTORY      has a past surgical history that includes Knee arthroscopy (Right) and Colonoscopy (2/26/16). CURRENT MEDICATIONS       Previous Medications    ALBUTEROL SULFATE HFA (PROVENTIL HFA) 108 (90 BASE) MCG/ACT INHALER    Inhale 2 puffs into the lungs every 4 hours as needed for Wheezing or Shortness of Breath (Space out to every 6 hours as symptoms improve) Space out to every 6 hours as symptoms improve. IBUPROFEN (ADVIL;MOTRIN) 200 MG TABLET    Take 200 mg by mouth every 6 hours as needed for Pain       ALLERGIES     is allergic to bactrim. FAMILY HISTORY     He indicated that his mother is alive. He indicated that his father is alive.  He indicated that the status of his maternal grandmother is unknown. He indicated that the status of his maternal grandfather is unknown. He indicated that the status of his paternal grandmother is unknown. He indicated that the status of his neg hx is unknown.   family history includes Diabetes in his maternal grandfather; Heart Disease in his maternal grandfather; High Blood Pressure in his maternal grandmother and paternal grandmother. SOCIAL HISTORY      reports that he is a non-smoker but has been exposed to tobacco smoke. He has never used smokeless tobacco. He reports that he does not currently use drugs. He reports that he does not drink alcohol. PHYSICAL EXAM     INITIAL VITALS:  height is 5' 6\" (1.676 m) and weight is 210 lb (95.3 kg). His temporal temperature is 98.6 °F (37 °C). His blood pressure is 117/68 and his pulse is 75. His respiration is 16 and oxygen saturation is 96%. Physical Exam  Vitals and nursing note reviewed. Constitutional:       General: He is not in acute distress. Appearance: He is obese. He is not ill-appearing. Eyes:      Conjunctiva/sclera: Conjunctivae normal.      Pupils: Pupils are equal, round, and reactive to light. Cardiovascular:      Rate and Rhythm: Normal rate and regular rhythm. Pulmonary:      Breath sounds: Wheezing present. Musculoskeletal:      Cervical back: Normal range of motion and neck supple. Lymphadenopathy:      Cervical: No cervical adenopathy. Skin:     General: Skin is dry. Findings: No erythema or rash. Neurological:      Mental Status: He is alert.         DIFFERENTIAL DIAGNOSIS:   COVID, bronchitis, asthma exacerbation not otherwise specified    DIAGNOSTIC RESULTS     EKG: All EKG's are interpreted by the Emergency Department Physician who either signs or Co-signs this chart in the absence of a cardiologist.      RADIOLOGY: non-plain film images(s) such as CT, Ultrasound and MRI are read by the radiologist.    LABS:   Labs Reviewed

## 2022-09-25 NOTE — ED NOTES
AVS rev'd with pt. And copy given with work note. Pulse regular. Extremities warm. Respirations regular and quiet. Mucous membranes pink & moist. Alert and oriented times 3. No nausea or vomiting. Range of motion within patient's limits. Skin pink, warm and dry. Calm and cooperative.       Violetta Gunter, RN  09/25/22 0483

## 2022-09-25 NOTE — ED NOTES
Pt. Presents ambulatory to  [address] [facility] c/o cough,headache, runny nose, fatigue for past couple days, Covid swab obtained and sent to lab.       Sabrina Jin RN  09/25/22 7748

## 2023-04-10 PROBLEM — J45.901 ASTHMA EXACERBATION, MILD: Status: ACTIVE | Noted: 2023-04-10

## 2023-04-17 PROBLEM — J45.41 MODERATE PERSISTENT ASTHMA WITH EXACERBATION: Status: ACTIVE | Noted: 2023-04-10

## 2024-01-26 ENCOUNTER — APPOINTMENT (OUTPATIENT)
Dept: GENERAL RADIOLOGY | Age: 24
End: 2024-01-26
Attending: EMERGENCY MEDICINE

## 2024-01-26 ENCOUNTER — HOSPITAL ENCOUNTER (EMERGENCY)
Age: 24
Discharge: HOME | End: 2024-01-26
Attending: EMERGENCY MEDICINE

## 2024-01-26 VITALS
TEMPERATURE: 98.3 F | HEIGHT: 65 IN | DIASTOLIC BLOOD PRESSURE: 75 MMHG | SYSTOLIC BLOOD PRESSURE: 118 MMHG | RESPIRATION RATE: 16 BRPM | WEIGHT: 220 LBS | BODY MASS INDEX: 36.65 KG/M2 | HEART RATE: 80 BPM | OXYGEN SATURATION: 96 %

## 2024-01-26 DIAGNOSIS — R19.7 NAUSEA VOMITING AND DIARRHEA: ICD-10-CM

## 2024-01-26 DIAGNOSIS — R11.2 NAUSEA VOMITING AND DIARRHEA: ICD-10-CM

## 2024-01-26 DIAGNOSIS — E87.6 HYPOKALEMIA: ICD-10-CM

## 2024-01-26 DIAGNOSIS — J11.1 INFLUENZA WITH RESPIRATORY MANIFESTATION OTHER THAN PNEUMONIA: Primary | ICD-10-CM

## 2024-01-26 LAB
ANION GAP SERPL CALC-SCNC: 11 MEQ/L (ref 8–16)
BUN SERPL-MCNC: 11 MG/DL (ref 7–18)
CALCIUM SERPL-MCNC: 8.3 MG/DL (ref 8.5–10.1)
CHLORIDE SERPL-SCNC: 101 MEQ/L (ref 98–107)
CO2 SERPL-SCNC: 24 MEQ/L (ref 21–32)
CREAT SERPL-MCNC: 1.1 MG/DL (ref 0.6–1.3)
FLUAV AG SPEC QL: NEGATIVE
FLUBV AG SPEC QL: POSITIVE
GFR SERPL CREATININE-BSD FRML MDRD: > 60 ML/MIN/1.73M2
GLUCOSE SERPL-MCNC: 136 MG/DL (ref 74–106)
POTASSIUM SERPL-SCNC: 2.9 MEQ/L (ref 3.5–5.1)
SARS-COV-2 RDRP RESP QL NAA+PROBE: NOT DETECTED
SODIUM SERPL-SCNC: 136 MEQ/L (ref 136–145)

## 2024-01-26 PROCEDURE — 87804 INFLUENZA ASSAY W/OPTIC: CPT

## 2024-01-26 PROCEDURE — 80048 BASIC METABOLIC PNL TOTAL CA: CPT

## 2024-01-26 PROCEDURE — 2580000003 HC RX 258: Performed by: EMERGENCY MEDICINE

## 2024-01-26 PROCEDURE — 87635 SARS-COV-2 COVID-19 AMP PRB: CPT

## 2024-01-26 PROCEDURE — 71046 X-RAY EXAM CHEST 2 VIEWS: CPT

## 2024-01-26 PROCEDURE — 6370000000 HC RX 637 (ALT 250 FOR IP): Performed by: EMERGENCY MEDICINE

## 2024-01-26 PROCEDURE — 99284 EMERGENCY DEPT VISIT MOD MDM: CPT

## 2024-01-26 PROCEDURE — 6360000002 HC RX W HCPCS: Performed by: EMERGENCY MEDICINE

## 2024-01-26 PROCEDURE — 96374 THER/PROPH/DIAG INJ IV PUSH: CPT

## 2024-01-26 RX ORDER — 0.9 % SODIUM CHLORIDE 0.9 %
1000 INTRAVENOUS SOLUTION INTRAVENOUS ONCE
Status: COMPLETED | OUTPATIENT
Start: 2024-01-26 | End: 2024-01-26

## 2024-01-26 RX ORDER — ACETAMINOPHEN 500 MG
1000 TABLET ORAL ONCE
Status: COMPLETED | OUTPATIENT
Start: 2024-01-26 | End: 2024-01-26

## 2024-01-26 RX ORDER — ONDANSETRON 4 MG/1
4 TABLET, ORALLY DISINTEGRATING ORAL ONCE
Status: COMPLETED | OUTPATIENT
Start: 2024-01-26 | End: 2024-01-26

## 2024-01-26 RX ORDER — KETOROLAC TROMETHAMINE 30 MG/ML
15 INJECTION, SOLUTION INTRAMUSCULAR; INTRAVENOUS ONCE
Status: COMPLETED | OUTPATIENT
Start: 2024-01-26 | End: 2024-01-26

## 2024-01-26 RX ORDER — POTASSIUM CHLORIDE 750 MG/1
40 TABLET, FILM COATED, EXTENDED RELEASE ORAL ONCE
Status: COMPLETED | OUTPATIENT
Start: 2024-01-26 | End: 2024-01-26

## 2024-01-26 RX ADMIN — POTASSIUM CHLORIDE 40 MEQ: 750 TABLET, FILM COATED, EXTENDED RELEASE ORAL at 12:14

## 2024-01-26 RX ADMIN — SODIUM CHLORIDE 1000 ML: 9 INJECTION, SOLUTION INTRAVENOUS at 11:26

## 2024-01-26 RX ADMIN — ONDANSETRON 4 MG: 4 TABLET, ORALLY DISINTEGRATING ORAL at 10:27

## 2024-01-26 RX ADMIN — KETOROLAC TROMETHAMINE 15 MG: 30 INJECTION INTRAMUSCULAR; INTRAVENOUS at 11:26

## 2024-01-26 RX ADMIN — ACETAMINOPHEN 1000 MG: 500 TABLET ORAL at 10:27

## 2024-01-26 ASSESSMENT — PAIN SCALES - GENERAL
PAINLEVEL_OUTOF10: 5
PAINLEVEL_OUTOF10: 7

## 2024-01-26 ASSESSMENT — PAIN DESCRIPTION - DESCRIPTORS: DESCRIPTORS: ACHING

## 2024-01-26 NOTE — DISCHARGE INSTRUCTIONS
Return to the Emergency Department immediately if you develop worsening shortnss of breath, any chest pain, weakness, dizziness, lightheadedness, persistent fevers , or you have any other concerns.  Please follow up with your primary care doctor in 2-3 days.  Drink lots of fluids

## 2024-01-26 NOTE — ED PROVIDER NOTES
[facility]      EMERGENCY MEDICINE     Pt Name: Arden Miranda III  MRN: [id number]  Birthdate 2000  Date of evaluation: 1/26/2024  Provider: DAMEON MILES DO, FACEP    CHIEF COMPLAINT       Chief Complaint   Patient presents with   • Fever   • Emesis   • Diarrhea     HISTORY OF PRESENT ILLNESS   Arden Miranda III is a pleasant 23 y.o. male who presents to the emergency department for evaluation of cough, myalgias, fever, and some shortness of breath for the last 3 days.  He is also had multiple episodes of vomiting and occasional diarrhea during that timeframe as well.  No chest pain, no abdominal pain, no lower extremity pain or swelling.  No sick contacts at home.  He is immunized.  States that since the symptoms started, at times when he lays down to sleep he feels short of breath; has been using his inhaler as prescribed    PASTMEDICAL HISTORY/Co-Morbid Conditions:     Past Medical History:   Diagnosis Date   • ADHD (attention deficit hyperactivity disorder)    • Asthma    • Headache        Patient Active Problem List   Diagnosis Code   • Moderate persistent asthma with exacerbation J45.41     SURGICAL HISTORY       Past Surgical History:   Procedure Laterality Date   • COLONOSCOPY  2/26/16   • KNEE ARTHROSCOPY Right        CURRENT MEDICATIONS       Previous Medications    ALBUTEROL (PROVENTIL) (2.5 MG/3ML) 0.083% NEBULIZER SOLUTION    Take 3 mLs by nebulization every 4 hours as needed for Wheezing    ALBUTEROL SULFATE HFA (PROVENTIL HFA) 108 (90 BASE) MCG/ACT INHALER    Inhale 2 puffs into the lungs every 4 hours as needed for Wheezing or Shortness of Breath (Space out to every 6 hours as symptoms improve) Space out to every 6 hours as symptoms improve.    BUDESONIDE-FORMOTEROL (SYMBICORT) 80-4.5 MCG/ACT AERO    Inhale 2 puffs into the lungs 2 times daily    IBUPROFEN (ADVIL;MOTRIN) 200 MG TABLET    Take 1 tablet by mouth every 6 hours as needed for Pain       ALLERGIES  within normal limits   COVID-19, RAPID   GLOMERULAR FILTRATION RATE, ESTIMATED   ANION GAP       (Any cultures that may have been sent were not resulted at the time of this patient visit)    MEDICAL DECISION MAKING / ED COURSE:   MDM  /  ED Course as of 24 1049      1117 Reassessed.  Nausea seems to have improved and he is tolerating oral fluids.  However he continues to have headache, myalgias, and overall not feeling well.  Will give a liter of IV fluid with a dose of Toradol. [AB]      ED Course User Index  [AB] Cesar Currie,      Vitals Reviewed:    Vitals:    24 1000 24 1230   BP: 134/87 118/75   Pulse: 72 80   Resp: 16 16   Temp: (!) 101.8 °F (38.8 °C) 98.3 °F (36.8 °C)   TempSrc: Temporal Temporal   SpO2: 95% 96%   Weight: 99.8 kg (220 lb)    Height: 1.651 m (5' 5\")        Differential Diagnosis includes (but not limited to):  Pneumonia, COVID, influenza, bacteremia, sepsis, PE    My Independent interpretations: (See Formal Diagnostic Results above for the lab & radiology testing)  EKG:        Imagin view chest x-ray  Labs:      Influenza B positive, COVID-negative    External Documentation:   Previous patient encounter documents & history available on EMR was reviewed     Not hypoxic.  Tolerating orals after the dose of Zofran without any emesis at this time.  Abdominal examination is benign. No clinical evidence of bacterial suprainfection  Tamiflu unlikely to offer any benefit.      Pt was reassessed and the results of pertinent diagnostic studies and exam findings were discussed. The patient’s provisional diagnosis and plan of care were discussed with the patient and present family utilizing shared decision-making. Any medications were reviewed and indications and risks of medications were discussed with the patient /family present. Strict verbal and written return precautions, instructions and appropriate follow-up provided to  the patient .

## 2024-01-26 NOTE — ED NOTES
Pt. Presents ambulatory to ED with c/o fever, vomiting and some diarrhea for past 3 days. Pt. Feels like his asthma is flaring up.

## 2024-01-26 NOTE — DISCHARGE INSTR - COC
Continuity of Care Form    Patient Name: Arden Miranda III   :  2000  MRN:  [id number]    Admit date:  2024  Discharge date:  ***    Code Status Order: Prior   Advance Directives:     Admitting Physician:  No admitting provider for patient encounter.  PCP: Renata Vila APRN - CNP    Discharging Nurse: ***  Discharging Hospital Unit/Room#: E2/E2  Discharging Unit Phone Number: ***    Emergency Contact:   Extended Emergency Contact Information  Primary Emergency Contact: Kalyan Miranda  Address: [address] [place] [id number] [facility]  Home Phone: [phone]  Relation: Parent    Past Surgical History:  Past Surgical History:   Procedure Laterality Date    COLONOSCOPY  16    KNEE ARTHROSCOPY Right        Immunization History:     There is no immunization history on file for this patient.    Active Problems:  Patient Active Problem List   Diagnosis Code    Moderate persistent asthma with exacerbation J45.41       Isolation/Infection:   Isolation            No Isolation          Patient Infection Status       Infection Onset Added Last Indicated Last Indicated By Review Planned Expiration Resolved Resolved By    Influenza 24 Rapid influenza A/B antigens 24                         Nurse Assessment:  Last Vital Signs: /75   Pulse 80   Temp 98.3 °F (36.8 °C) (Temporal)   Resp 16   Ht 1.651 m (5' 5\")   Wt 99.8 kg (220 lb)   SpO2 96%   BMI 36.61 kg/m²     Last documented pain score (0-10 scale): Pain Level: 5  Last Weight:   Wt Readings from Last 1 Encounters:   24 99.8 kg (220 lb)     Mental Status:  {IP PT MENTAL STATUS:}    IV Access:  { MARILEE IV ACCESS:357554650}    Nursing Mobility/ADLs:  Walking   {CHP DME ADLs:052384671}  Transfer  {CHP DME ADLs:439469709}  Bathing  {CHP DME ADLs:040500887}  Dressing  {CHP DME ADLs:727003042}  Toileting  {CHP DME ADLs:514074773}  Feeding  {CHP DME  ADLs:640316751}  Med Admin  {[facility] DME ADLs:149677849}  Med Delivery   { MARILEE MED Delivery:909858054}    Wound Care Documentation and Therapy:        Elimination:  Continence:   Bowel: {YES / NO:}  Bladder: {YES / NO:}  Urinary Catheter: {Urinary Catheter:204434353}   Colostomy/Ileostomy/Ileal Conduit: {YES / NO:}       Date of Last BM: ***  No intake or output data in the 24 hours ending 24 1241  No intake/output data recorded.    Safety Concerns:     { MARILEE Safety Concerns:747348006}    Impairments/Disabilities:      { MARILEE Impairments/Disabilities:998887316}    Nutrition Therapy:  Current Nutrition Therapy:   {[facility] Diet List:830455878}    Routes of Feeding: {[facility] DME Other Feedings:687390337}  Liquids: {Slp liquid thickness:18123}  Daily Fluid Restriction: {[facility] DME Yes amt example:364509866}  Last Modified Barium Swallow with Video (Video Swallowing Test): {Done Not Done Date:}    Treatments at the Time of Hospital Discharge:   Respiratory Treatments: ***  Oxygen Therapy:  {Therapy; copd oxygen:45174}  Ventilator:    {[facility] Vent List:861021777}    Rehab Therapies: {THERAPEUTIC INTERVENTION:8060040365}  Weight Bearing Status/Restrictions: {[facility] Weight Bearin}  Other Medical Equipment (for information only, NOT a DME order):  {EQUIPMENT:674084414}  Other Treatments: ***    Patient's personal belongings (please select all that are sent with patient):  {[facility] DME Belongings:616054723}    RN SIGNATURE:  {Esignature:505649287}    CASE MANAGEMENT/SOCIAL WORK SECTION    Inpatient Status Date: ***    Readmission Risk Assessment Score:  Readmission Risk              Risk of Unplanned Readmission:  0           Discharging to Facility/ Agency   Name:   Address:  Phone:  Fax:    Dialysis Facility (if applicable)   Name:  Address:  Dialysis Schedule:  Phone:  Fax:    / signature: {Esignature:224490882}    PHYSICIAN SECTION    Prognosis:

## 2024-01-26 NOTE — ED NOTES
AVS rev'd with pt. And copy given.  Pulse regular. Extremities warm. Respirations regular and quiet.  Mucous membranes pink & moist. Alert and oriented times 3.  No nausea or vomiting. Range of motion within patient's limits. Skin pink, warm and dry.  Calm and cooperative.

## 2024-06-08 NOTE — PROGRESS NOTES
Chief Complaint   Patient presents with    Cough    Other     possible covid        Results for POC orders placed in visit on 12/28/21   POCT Influenza A/B   Result Value Ref Range    Influenza A Ab negative     Influenza B Ab negative      covid positive    No symptoms    Push fluids  Tylenol or ibuprofen prn fever  Cool mist Humidifier in the bedroom  Follow up if not better in 1 week or if symptoms get worse. Spontaneous, unlabored and symmetrical

## 2024-12-10 ENCOUNTER — HOSPITAL ENCOUNTER (EMERGENCY)
Age: 24
Discharge: HOME | End: 2024-12-10
Attending: FAMILY MEDICINE

## 2024-12-10 VITALS
SYSTOLIC BLOOD PRESSURE: 125 MMHG | BODY MASS INDEX: 36.96 KG/M2 | TEMPERATURE: 97.5 F | HEIGHT: 66 IN | OXYGEN SATURATION: 97 % | DIASTOLIC BLOOD PRESSURE: 70 MMHG | RESPIRATION RATE: 16 BRPM | HEART RATE: 96 BPM | WEIGHT: 230 LBS

## 2024-12-10 DIAGNOSIS — R04.0 EPISTAXIS: Primary | ICD-10-CM

## 2024-12-10 PROCEDURE — 30901 CONTROL OF NOSEBLEED: CPT

## 2024-12-10 PROCEDURE — 99283 EMERGENCY DEPT VISIT LOW MDM: CPT

## 2024-12-10 ASSESSMENT — PAIN - FUNCTIONAL ASSESSMENT: PAIN_FUNCTIONAL_ASSESSMENT: NONE - DENIES PAIN

## 2024-12-10 NOTE — DISCHARGE INSTRUCTIONS
Arden Miranda III,    It has been my absolute pleasure to serve you while in the emergency department at [facility] today.  Please do remember to take all medications as prescribed.  Please make sure you follow-up with your PCP within 7 days.  Please follow-up with any and all specialists as we discussed.  Please return to the ER in case of any worsening of symptoms.  I wish you a speedy recovery!    Sincerely,    Dr. Navarro Garcia MD.

## 2024-12-10 NOTE — ED NOTES
No active bleeding at this time.  AVS rev'd with pt. And copy given. Pulse regular. Extremities warm. Respirations regular and quiet.  Mucous membranes pink & moist. Alert and oriented times 3.  No nausea or vomiting. Range of motion within patient's limits. Skin pink, warm and dry.  Calm and cooperative.

## 2024-12-10 NOTE — ED PROVIDER NOTES
[facility]  eMERGENCY dEPARTMENT eNCOUnter          CHIEF COMPLAINT       Chief Complaint   Patient presents with    Epistaxis       Nurses Notes reviewed and I agree except as noted in the HPI.    HISTORY OF PRESENT ILLNESS    Arden Miranda III is a 24 y.o. male who presents to the Emergency Department for the evaluation of epistaxis. Patient says that nose bleed has been going on for 3 days. Denies any trauma. He says that he has required cauterization in the past.       The HPI was provided by the patient.     REVIEW OF SYSTEMS       Eyes: no vision changes  Ears, Nose, Mouth, Throat: no ear pain, no nasal swelling, + epistaxis, no difficulty swallowing  Cardiovascular: no chest pains  Respiratory: no SOB, no cough  Gastrointestinal: no abdominal pain, no nausea, no vomiting, no diarrhea  Genitourinary: no change in urinary frequency, no dysuria symptoms  Musculoskeletal: no joint pains, no muscle pains  Integumentary (skin and/or breast): no rashes, no swelling, no redness  Neurological: no weakness, no facial droop, no confusion  Psychiatric: no depression, no anxiety    MEDICAL HISTORY    has a past medical history of ADHD (attention deficit hyperactivity disorder), Asthma, and Headache.    SURGICAL HISTORY      has a past surgical history that includes Knee arthroscopy (Right) and Colonoscopy (2/26/16).    CURRENT MEDICATIONS       Discharge Medication List as of 12/10/2024 11:55 AM        CONTINUE these medications which have NOT CHANGED    Details   albuterol sulfate HFA (PROVENTIL HFA) 108 (90 Base) MCG/ACT inhaler Inhale 2 puffs into the lungs every 4 hours as needed for Wheezing or Shortness of Breath (Space out to every 6 hours as symptoms improve) Space out to every 6 hours as symptoms improve., Disp-18 g, R-0Normal      albuterol (PROVENTIL) (2.5 MG/3ML) 0.083% nebulizer solution Take 3 mLs by nebulization every 4 hours as needed for Wheezing, Disp-120 each, R-1Normal

## 2024-12-10 NOTE — DISCHARGE INSTR - COC
Continuity of Care Form    Patient Name: Arden Miranda III   :  2000  MRN:  [id number]    Admit date:  12/10/2024  Discharge date:  ***    Code Status Order: Prior   Advance Directives:   Advance Care Flowsheet Documentation             Admitting Physician:  No admitting provider for patient encounter.  PCP: Renata Vila APRN - CNP    Discharging Nurse: ***  Discharging Hospital Unit/Room#: E2/E2  Discharging Unit Phone Number: ***    Emergency Contact:   Extended Emergency Contact Information  Primary Emergency Contact: Kalyan Miranda  Address: [address] [place] [id number] [place]  Home Phone: [phone]  Relation: Parent    Past Surgical History:  Past Surgical History:   Procedure Laterality Date    COLONOSCOPY  16    KNEE ARTHROSCOPY Right        Immunization History:     There is no immunization history on file for this patient.    Active Problems:  Patient Active Problem List   Diagnosis Code    Moderate persistent asthma with exacerbation J45.41       Isolation/Infection:   Isolation            No Isolation          Patient Infection Status       Infection Onset Added Last Indicated Last Indicated By Review Planned Expiration Resolved Resolved By    None active    Resolved    Influenza 24 Rapid influenza A/B antigens   24 Infection                        Nurse Assessment:  Last Vital Signs: /70   Pulse 96   Temp 97.5 °F (36.4 °C) (Temporal)   Resp 16   Ht 1.676 m (5' 6\")   Wt 104.3 kg (230 lb)   SpO2 97%   BMI 37.12 kg/m²     Last documented pain score (0-10 scale):    Last Weight:   Wt Readings from Last 1 Encounters:   12/10/24 104.3 kg (230 lb)     Mental Status:  {IP PT MENTAL STATUS:}    IV Access:  { MARILEE IV ACCESS:177193196}    Nursing Mobility/ADLs:  Walking   {CHP DME ADLs:793528298}  Transfer  {CHP DME ADLs:157659638}  Bathing  {CHP DME ADLs:730053770}  Dressing  {CHP DME

## 2024-12-10 NOTE — ED TRIAGE NOTES
Pt. Presents ambulatory to ED with c/o nosebleeds x 2 last night, pt. Denies any active bleeding  at this time.

## 2024-12-13 ENCOUNTER — TELEPHONE (OUTPATIENT)
Dept: ENT CLINIC | Age: 24
End: 2024-12-13

## 2024-12-13 NOTE — TELEPHONE ENCOUNTER
Thank you for giving him these instructions.  He can also start utilizing a humidifier at bedside overnight to help with nasal dryness and can try AYR nasal saline spray 3 times a day to help with dryness as well.  If patient is not having active nosebleed plan to try these interventions and I can see him in office January 13 at 1120.  If he tries these things for a couple of weeks and despite this he is still having daily nosebleeds he should call the office to see if we are able to get him in sooner.

## 2024-12-13 NOTE — TELEPHONE ENCOUNTER
Arden called in to see about getting an appointment.   He was seen in the ER on 12/10 for nosebleeds.   They did a rhino rocket, but it fell out in his sleep yesterday. He said he has had 2 nosebleeds since his ER visit.  He said they take can take up to an hour to stop.  I covered the steps below for controlling an active nosebleed.  Please advise how soon he should be scheduled.      Epistaxis:    -When did the bleeding start? Had them most of his life  -What side of the nose is bleeding? Left side  -Any recent/trauma inciting event? No trauma  -What interventions have they tried already: pinching nose and ice  -Any current anticoagulation/blood thinners: no  -Any recent instrumentation/packing: ER, but fell out in his sleep yesterday    If you have a nosebleed at home, you should try the following:    Lightly blow your nose in attempts to dislodge old blood and blood clots  Spray 3 sprays of Afrin (oxymetazoline) to the bleeding nostril. If unsure which nostril is bleeding, spray both nostrils  Apply direct pressure to the nose, compressing both nostrils completely shut. You can use your fingers or nasal clamp  Hold pressure with your fingers/nasal clamp for at least 15 minutes. Do not release pressure to check if bleeding has resolved before that.  If bleeding has not resolved after this, you can repeat steps 1-4. You can do this up to 3 times consecutively, but if bleeding still persists you should consider being evaluated in the ER or ENT office.

## 2024-12-17 NOTE — TELEPHONE ENCOUNTER
3rd attempt to contact the patient. Patient did not answer so I left a VM asking that he call the office. Will also be sending the patient a letter.

## 2025-06-12 ENCOUNTER — APPOINTMENT (OUTPATIENT)
Dept: GENERAL RADIOLOGY | Age: 25
End: 2025-06-12

## 2025-06-12 ENCOUNTER — HOSPITAL ENCOUNTER (EMERGENCY)
Age: 25
Discharge: HOME | End: 2025-06-12
Attending: FAMILY MEDICINE

## 2025-06-12 VITALS
BODY MASS INDEX: 40.18 KG/M2 | OXYGEN SATURATION: 98 % | SYSTOLIC BLOOD PRESSURE: 127 MMHG | RESPIRATION RATE: 20 BRPM | HEIGHT: 66 IN | TEMPERATURE: 97 F | WEIGHT: 250 LBS | DIASTOLIC BLOOD PRESSURE: 99 MMHG | HEART RATE: 70 BPM

## 2025-06-12 DIAGNOSIS — M79.641 RIGHT HAND PAIN: Primary | ICD-10-CM

## 2025-06-12 DIAGNOSIS — S60.10XA SUBUNGUAL HEMATOMA OF DIGIT OF HAND, INITIAL ENCOUNTER: ICD-10-CM

## 2025-06-12 PROCEDURE — 99283 EMERGENCY DEPT VISIT LOW MDM: CPT

## 2025-06-12 PROCEDURE — 73130 X-RAY EXAM OF HAND: CPT

## 2025-06-12 RX ORDER — CLINDAMYCIN HYDROCHLORIDE 150 MG/1
150 CAPSULE ORAL 3 TIMES DAILY
Qty: 21 CAPSULE | Refills: 0 | Status: SHIPPED | OUTPATIENT
Start: 2025-06-12 | End: 2025-06-19

## 2025-06-12 ASSESSMENT — PAIN - FUNCTIONAL ASSESSMENT: PAIN_FUNCTIONAL_ASSESSMENT: NONE - DENIES PAIN

## 2025-06-12 NOTE — DISCHARGE INSTR - COC
Continuity of Care Form    Patient Name: Arden Miranda III   :  2000  MRN:  [id number]    Admit date:  2025  Discharge date:  ***    Code Status Order: Prior   Advance Directives:     Admitting Physician:  No admitting provider for patient encounter.  PCP: Renata Vila APRN - CNP    Discharging Nurse: ***  Discharging Hospital Unit/Room#: E6/E6  Discharging Unit Phone Number: ***    Emergency Contact:   Extended Emergency Contact Information  Primary Emergency Contact: Kalyan Miranda  Address: [address] [place] [id number] [facility]  Home Phone: [phone]  Relation: Parent    Past Surgical History:  Past Surgical History:   Procedure Laterality Date    COLONOSCOPY  15    KNEE ARTHROSCOPY Right        Immunization History:     There is no immunization history on file for this patient.    Active Problems:  Patient Active Problem List   Diagnosis Code    Moderate persistent asthma with exacerbation J45.41       Isolation/Infection:   Isolation            No Isolation          Patient Infection Status    No active infections.   Resolved       Infection Onset Added Last Indicated Last Indicated By Resolved Resolved By    Influenza 24 Rapid influenza A/B antigens 24 Infection                          Nurse Assessment:  Last Vital Signs: BP (!) 127/99   Pulse 70   Temp 97 °F (36.1 °C) (Temporal)   Resp 20   Ht 1.676 m (5' 6\")   Wt 113.4 kg (250 lb)   SpO2 98%   BMI 40.35 kg/m²     Last documented pain score (0-10 scale):    Last Weight:   Wt Readings from Last 1 Encounters:   25 113.4 kg (250 lb)     Mental Status:  {IP PT MENTAL STATUS:}    IV Access:  { MARILEE IV ACCESS:632278644}    Nursing Mobility/ADLs:  Walking   {CHP DME ADLs:852098160}  Transfer  {CHP DME ADLs:306381185}  Bathing  {CHP DME ADLs:193603897}  Dressing  {CHP DME ADLs:003422440}  Toileting  {CHP DME ADLs:274905658}  Feeding

## 2025-06-12 NOTE — ED TRIAGE NOTES
Pt comes walking into ER room 6 from triage with a right ring finger in jury that happened 2 days ago when he accidentally slammed his hand in a car door.

## 2025-06-12 NOTE — ED PROVIDER NOTES
MD on    06/12/2025 06:59 AM          LABS:   Labs Reviewed - No data to display    EMERGENCY DEPARTMENT COURSE & MDM:   Vitals:    Vitals:    06/12/25 0640   BP: (!) 127/99   Pulse: 70   Resp: 20   Temp: 97 °F (36.1 °C)   TempSrc: Temporal   SpO2: 98%   Weight: 113.4 kg (250 lb)   Height: 1.676 m (5' 6\")       MDM    7:00 AM EDT: The patient was seen and evaluated.  ED Course as of 06/12/25 0702   u Jun 12, 2025   0654 We will obtain XR of right hand [SM]   0659 XR does not reveal any acute osseous abnormality. Patient has evidence of subungual hematoma. We will put him on oral antibiotics. I feel he is safe for discharge.  []      ED Course User Index  [SM] Navarro Garcia MD         1)  Number and Complexity of Problems  Problem List This Visit:   Chief Complaint   Patient presents with    Hand Injury     Diagnoses Considered but Do Not Suspect:  Please see Differential Diagnosis  Pertinent Comorbid Conditions:   Please see PMH and ED course     2)  Data Reviewed  EKG was reviewed in detail. See EKG section above.  Decision Rules/Scores utilized:  See ED course.  Tests considered but not ordered and why:  See ED course.   External Documents Reviewed:  Prior medical records  Imaging that is independently reviewed with the radiologist report, not interpreted by me are listed in ED course.   Imaging that is independently reviewed and interpreted by me are listed in ED course.      See more data below for the lab and radiology tests and orders.     Medical Decision Making  Amount and/or Complexity of Data Reviewed  Labs: ordered.  Radiology: ordered.  ECG/medicine tests: ordered.     Risk  Prescription drug management.     3)  Treatment and Disposition    Disposition discussion with patient/family:  Yes  Social determinants of health impacting treatment or disposition:  Not applicable  Shared Decision Making:  Not applicable  Code Status Discussion:  Not applicable    CRITICAL CARE:

## 2025-06-12 NOTE — ED NOTES
Pt stable and off to Radiology via ED cart with [employer] tech. Pt states no concerns and vitals stable.

## 2025-06-12 NOTE — DISCHARGE INSTRUCTIONS
Arden Miranda III,    It has been my absolute pleasure to serve you while in the emergency department at [facility] today.  Please do remember to take all medications as prescribed.  Please make sure you follow-up with your PCP within 7 days.  Please follow-up with any and all specialists as we discussed.  Please return to the ER in case of any worsening of symptoms.  I wish you a speedy recovery!    Sincerely,    Dr. Navarro Garcia MD.